# Patient Record
Sex: FEMALE | Race: WHITE | NOT HISPANIC OR LATINO
[De-identification: names, ages, dates, MRNs, and addresses within clinical notes are randomized per-mention and may not be internally consistent; named-entity substitution may affect disease eponyms.]

---

## 2017-05-31 PROBLEM — Z00.00 ENCOUNTER FOR PREVENTIVE HEALTH EXAMINATION: Status: ACTIVE | Noted: 2017-05-31

## 2017-07-25 PROBLEM — Z00.00 ENCOUNTER FOR PREVENTIVE HEALTH EXAMINATION: Noted: 2017-07-25

## 2017-08-11 ENCOUNTER — APPOINTMENT (OUTPATIENT)
Dept: HEART AND VASCULAR | Facility: CLINIC | Age: 72
End: 2017-08-11
Payer: MEDICARE

## 2017-08-11 VITALS
OXYGEN SATURATION: 98 % | WEIGHT: 122 LBS | SYSTOLIC BLOOD PRESSURE: 104 MMHG | HEIGHT: 63.5 IN | TEMPERATURE: 98 F | RESPIRATION RATE: 15 BRPM | BODY MASS INDEX: 21.35 KG/M2 | HEART RATE: 97 BPM | DIASTOLIC BLOOD PRESSURE: 70 MMHG

## 2017-08-11 DIAGNOSIS — R94.31 ABNORMAL ELECTROCARDIOGRAM [ECG] [EKG]: ICD-10-CM

## 2017-08-11 DIAGNOSIS — D05.12 INTRADUCTAL CARCINOMA IN SITU OF LEFT BREAST: ICD-10-CM

## 2017-08-11 DIAGNOSIS — B37.81 CANDIDAL ESOPHAGITIS: ICD-10-CM

## 2017-08-11 DIAGNOSIS — Z82.49 FAMILY HISTORY OF ISCHEMIC HEART DISEASE AND OTHER DISEASES OF THE CIRCULATORY SYSTEM: ICD-10-CM

## 2017-08-11 DIAGNOSIS — K90.0 CELIAC DISEASE: ICD-10-CM

## 2017-08-11 DIAGNOSIS — Z80.3 FAMILY HISTORY OF MALIGNANT NEOPLASM OF BREAST: ICD-10-CM

## 2017-08-11 DIAGNOSIS — R09.89 OTHER SPECIFIED SYMPTOMS AND SIGNS INVOLVING THE CIRCULATORY AND RESPIRATORY SYSTEMS: ICD-10-CM

## 2017-08-11 DIAGNOSIS — F41.8 OTHER SPECIFIED ANXIETY DISORDERS: ICD-10-CM

## 2017-08-11 DIAGNOSIS — Z82.3 FAMILY HISTORY OF STROKE: ICD-10-CM

## 2017-08-11 DIAGNOSIS — Z80.0 FAMILY HISTORY OF MALIGNANT NEOPLASM OF DIGESTIVE ORGANS: ICD-10-CM

## 2017-08-11 DIAGNOSIS — F17.210 NICOTINE DEPENDENCE, CIGARETTES, UNCOMPLICATED: ICD-10-CM

## 2017-08-11 PROCEDURE — 99215 OFFICE O/P EST HI 40 MIN: CPT | Mod: 25

## 2017-08-11 PROCEDURE — 93000 ELECTROCARDIOGRAM COMPLETE: CPT

## 2017-08-11 PROCEDURE — 93880 EXTRACRANIAL BILAT STUDY: CPT | Mod: XE

## 2017-08-11 PROCEDURE — 93306 TTE W/DOPPLER COMPLETE: CPT | Mod: XE

## 2017-08-11 RX ORDER — ALPRAZOLAM 0.5 MG/1
0.5 TABLET ORAL
Qty: 180 | Refills: 0 | Status: ACTIVE | COMMUNITY
Start: 2017-04-24

## 2017-08-14 ENCOUNTER — APPOINTMENT (OUTPATIENT)
Dept: HEART AND VASCULAR | Facility: CLINIC | Age: 72
End: 2017-08-14
Payer: MEDICARE

## 2017-08-14 PROBLEM — F41.8 ANXIETY AND DEPRESSION: Status: ACTIVE | Noted: 2017-08-14

## 2017-08-14 PROBLEM — K90.0 CELIAC DISEASE: Status: ACTIVE | Noted: 2017-08-14

## 2017-08-14 PROBLEM — B37.81 CANDIDA ESOPHAGITIS: Status: RESOLVED | Noted: 2017-08-14 | Resolved: 2017-08-14

## 2017-08-14 PROCEDURE — 78452 HT MUSCLE IMAGE SPECT MULT: CPT

## 2017-08-14 PROCEDURE — A9500: CPT

## 2017-08-14 PROCEDURE — 93015 CV STRESS TEST SUPVJ I&R: CPT

## 2017-08-14 PROCEDURE — A9505: CPT

## 2018-05-01 ENCOUNTER — APPOINTMENT (OUTPATIENT)
Dept: PULMONOLOGY | Facility: CLINIC | Age: 73
End: 2018-05-01
Payer: MEDICARE

## 2018-05-01 VITALS
DIASTOLIC BLOOD PRESSURE: 70 MMHG | SYSTOLIC BLOOD PRESSURE: 108 MMHG | HEIGHT: 65 IN | BODY MASS INDEX: 20.33 KG/M2 | WEIGHT: 122 LBS | HEART RATE: 84 BPM | OXYGEN SATURATION: 95 % | TEMPERATURE: 97.8 F

## 2018-05-01 PROCEDURE — 99204 OFFICE O/P NEW MOD 45 MIN: CPT | Mod: 25

## 2018-05-01 PROCEDURE — 94010 BREATHING CAPACITY TEST: CPT

## 2018-06-12 ENCOUNTER — OUTPATIENT (OUTPATIENT)
Dept: OUTPATIENT SERVICES | Facility: HOSPITAL | Age: 73
LOS: 1 days | End: 2018-06-12
Payer: MEDICARE

## 2018-06-12 PROCEDURE — 73630 X-RAY EXAM OF FOOT: CPT | Mod: 26,LT

## 2018-06-12 PROCEDURE — 73630 X-RAY EXAM OF FOOT: CPT

## 2018-06-12 PROCEDURE — 73600 X-RAY EXAM OF ANKLE: CPT

## 2018-06-12 PROCEDURE — 73600 X-RAY EXAM OF ANKLE: CPT | Mod: 26,LT

## 2018-07-25 PROBLEM — Z80.0 FAMILY HISTORY OF COLON CANCER: Status: ACTIVE | Noted: 2017-08-11

## 2018-07-25 PROBLEM — R94.31 ABNORMAL ELECTROCARDIOGRAM: Status: ACTIVE | Noted: 2017-08-14

## 2019-05-28 ENCOUNTER — APPOINTMENT (OUTPATIENT)
Dept: HEART AND VASCULAR | Facility: CLINIC | Age: 74
End: 2019-05-28
Payer: MEDICARE

## 2019-05-28 ENCOUNTER — LABORATORY RESULT (OUTPATIENT)
Age: 74
End: 2019-05-28

## 2019-05-28 ENCOUNTER — APPOINTMENT (OUTPATIENT)
Dept: HEART AND VASCULAR | Facility: CLINIC | Age: 74
End: 2019-05-28

## 2019-05-28 VITALS
TEMPERATURE: 97.8 F | OXYGEN SATURATION: 96 % | WEIGHT: 123 LBS | SYSTOLIC BLOOD PRESSURE: 118 MMHG | BODY MASS INDEX: 20.49 KG/M2 | HEIGHT: 65 IN | DIASTOLIC BLOOD PRESSURE: 76 MMHG | HEART RATE: 79 BPM | RESPIRATION RATE: 15 BRPM

## 2019-05-28 PROCEDURE — 36415 COLL VENOUS BLD VENIPUNCTURE: CPT

## 2019-05-28 PROCEDURE — 99214 OFFICE O/P EST MOD 30 MIN: CPT

## 2019-05-28 PROCEDURE — 93000 ELECTROCARDIOGRAM COMPLETE: CPT

## 2019-05-28 PROCEDURE — 93306 TTE W/DOPPLER COMPLETE: CPT

## 2019-05-28 RX ORDER — VENLAFAXINE HYDROCHLORIDE 150 MG/1
150 CAPSULE, EXTENDED RELEASE ORAL
Qty: 90 | Refills: 0 | Status: DISCONTINUED | COMMUNITY
Start: 2017-07-10 | End: 2019-05-28

## 2019-05-28 RX ORDER — ANASTROZOLE TABLETS 1 MG/1
1 TABLET ORAL
Qty: 90 | Refills: 0 | Status: DISCONTINUED | COMMUNITY
Start: 2017-07-28 | End: 2019-05-28

## 2019-05-29 LAB
ALBUMIN SERPL ELPH-MCNC: 4.5 G/DL
ALP BLD-CCNC: 64 U/L
ALT SERPL-CCNC: 18 U/L
ANION GAP SERPL CALC-SCNC: 14 MMOL/L
AST SERPL-CCNC: 36 U/L
BASOPHILS # BLD AUTO: 0.1 K/UL
BASOPHILS NFR BLD AUTO: 1 %
BILIRUB SERPL-MCNC: 0.5 MG/DL
BUN SERPL-MCNC: 13 MG/DL
CALCIUM SERPL-MCNC: 9.9 MG/DL
CHLORIDE SERPL-SCNC: 99 MMOL/L
CHOLEST SERPL-MCNC: 219 MG/DL
CHOLEST/HDLC SERPL: 2.8 RATIO
CO2 SERPL-SCNC: 25 MMOL/L
CREAT SERPL-MCNC: 0.95 MG/DL
CREAT SPEC-SCNC: 342 MG/DL
EOSINOPHIL # BLD AUTO: 0.15 K/UL
EOSINOPHIL NFR BLD AUTO: 1.5 %
ESTIMATED AVERAGE GLUCOSE: 91 MG/DL
FOLATE SERPL-MCNC: <2 NG/ML
GLUCOSE SERPL-MCNC: 69 MG/DL
HBA1C MFR BLD HPLC: 4.8 %
HCT VFR BLD CALC: 38.4 %
HDLC SERPL-MCNC: 78 MG/DL
HGB BLD-MCNC: 12.8 G/DL
IMM GRANULOCYTES NFR BLD AUTO: 0.3 %
LDLC SERPL CALC-MCNC: 121 MG/DL
LYMPHOCYTES # BLD AUTO: 2.91 K/UL
LYMPHOCYTES NFR BLD AUTO: 29.7 %
MAGNESIUM SERPL-MCNC: 1.6 MG/DL
MAN DIFF?: NORMAL
MCHC RBC-ENTMCNC: 33.3 GM/DL
MCHC RBC-ENTMCNC: 34.9 PG
MCV RBC AUTO: 104.6 FL
MICROALBUMIN 24H UR DL<=1MG/L-MCNC: 3.6 MG/DL
MICROALBUMIN/CREAT 24H UR-RTO: 11 MG/G
MONOCYTES # BLD AUTO: 1.14 K/UL
MONOCYTES NFR BLD AUTO: 11.6 %
NEUTROPHILS # BLD AUTO: 5.48 K/UL
NEUTROPHILS NFR BLD AUTO: 55.9 %
PLATELET # BLD AUTO: 364 K/UL
POTASSIUM SERPL-SCNC: 4.9 MMOL/L
PROT SERPL-MCNC: 7.2 G/DL
RBC # BLD: 3.67 M/UL
RBC # FLD: 17.4 %
SODIUM SERPL-SCNC: 138 MMOL/L
TRIGL SERPL-MCNC: 98 MG/DL
TSH SERPL-ACNC: 3.58 UIU/ML
VIT B12 SERPL-MCNC: 1014 PG/ML
WBC # FLD AUTO: 9.81 K/UL

## 2019-05-30 LAB
APPEARANCE: ABNORMAL
BILIRUBIN URINE: ABNORMAL
BLOOD URINE: NEGATIVE
COLOR: ABNORMAL
GLUCOSE QUALITATIVE U: NEGATIVE
KETONES URINE: ABNORMAL
LEUKOCYTE ESTERASE URINE: ABNORMAL
NITRITE URINE: NEGATIVE
PH URINE: 5
PROTEIN URINE: NORMAL
SPECIFIC GRAVITY URINE: 1.03
UROBILINOGEN URINE: NORMAL

## 2019-06-02 NOTE — HISTORY OF PRESENT ILLNESS
[FreeTextEntry1] : Long overdue for blood work \par \par No c/o  chest pains\par \par \par Still smoking  , her  also a smoker just had CABG  and was diagnosed with afib

## 2019-06-02 NOTE — DISCUSSION/SUMMARY
[Non-specific ECG Changes] : abnormal ECG [Echocardiogram] : an echocardiogram [Outpatient Evaluation] : the evaluation will be done as an outpatient [COPD] : chronic obstructive pulmonary disease [Smoking Cessation] : smoking cessation [___ Month(s)] : [unfilled] month(s) [With Me] : with me [FreeTextEntry1] : echocardiogram shows normal LVEF with normal   wall    motion , normal RVSP   and no pericardial effusion\par \par venipuncture performed with B12, folate, HgbA1c, lipids,  vitamin D, etc \par \par counseled regarding smoking cessation \par \par Set up    nuclear stress test in August

## 2019-06-02 NOTE — REVIEW OF SYSTEMS
[Leg Claudication] : intermittent leg claudication [Feeling Fatigued] : feeling fatigued [Dyspnea on exertion] : dyspnea during exertion [Anxiety] : anxiety [Negative] : Heme/Lymph

## 2019-06-02 NOTE — REASON FOR VISIT
[Follow-Up - Clinic] : a clinic follow-up of [Dyspnea] : dyspnea [Abnormal ECG] : an abnormal ECG [FreeTextEntry1] : 74  year old chronic heavy smoker  with hx of  DCIS of left breast  and HTN  presents for follow up. \par \par Resting EKG shows NSR   68 bpm with low voltage  but no ischemic changes  or ectopy. There is family hx of premature CAD.  Had negative nuclear  stress test  August 2017. \par \par She  also  c/o leg pains  and numbness of her hands and feet  but denies hx of thrombophilia \par \par Denies hemoptysis, fevers, syncope, orthopnea, PND, wheezing.  No hematuria, rectal bleeding, strokes or TIAs.

## 2019-06-02 NOTE — PHYSICAL EXAM
[General Appearance - Well Developed] : well developed [Well Groomed] : well groomed [General Appearance - Well Nourished] : well nourished [Normal Appearance] : normal appearance [No Deformities] : no deformities [General Appearance - In No Acute Distress] : no acute distress [Normal Conjunctiva] : the conjunctiva exhibited no abnormalities [Eyelids - No Xanthelasma] : the eyelids demonstrated no xanthelasmas [FreeTextEntry1] : no JVD [No Oral Cyanosis] : no oral cyanosis [Exaggerated Use Of Accessory Muscles For Inspiration] : no accessory muscle use [Heart Rate And Rhythm] : heart rate and rhythm were normal [Auscultation Breath Sounds / Voice Sounds] : lungs were clear to auscultation bilaterally [Respiration, Rhythm And Depth] : normal respiratory rhythm and effort [Heart Sounds] : normal S1 and S2 [Edema] : no peripheral edema present [Murmurs] : no murmurs present [Abdomen Soft] : soft [Bowel Sounds] : normal bowel sounds [Abdomen Tenderness] : non-tender [Abdomen Mass (___ Cm)] : no abdominal mass palpated [Gait - Sufficient For Exercise Testing] : the gait was sufficient for exercise testing [Abnormal Walk] : normal gait [Nail Splinter Hemorrhages] : no splinter hemorrhages of the nails [Petechial Hemorrhages (___cm)] : no petechial hemorrhages [Nail Clubbing] : no clubbing of the fingernails [Cyanosis, Localized] : no localized cyanosis [Fingers Osler's Nodes] : Osler's nodes were not seenon the fingers [Skin Color & Pigmentation] : normal skin color and pigmentation [] : no rash [Skin Turgor] : normal skin turgor [No Venous Stasis] : no venous stasis [No Skin Ulcers] : no skin ulcer [Skin Lesions] : no skin lesions [Oriented To Time, Place, And Person] : oriented to person, place, and time [No Xanthoma] : no  xanthoma was observed [Impaired Insight] : insight and judgment were intact [Mood] : the mood was normal [Affect] : the affect was normal [No Anxiety] : not feeling anxious [Memory Recent] : recent memory was not impaired [Memory Remote] : remote memory was not impaired

## 2019-06-18 ENCOUNTER — CLINICAL ADVICE (OUTPATIENT)
Age: 74
End: 2019-06-18

## 2019-06-30 ENCOUNTER — FORM ENCOUNTER (OUTPATIENT)
Age: 74
End: 2019-06-30

## 2019-07-01 ENCOUNTER — FORM ENCOUNTER (OUTPATIENT)
Age: 74
End: 2019-07-01

## 2019-07-01 ENCOUNTER — OUTPATIENT (OUTPATIENT)
Dept: OUTPATIENT SERVICES | Facility: HOSPITAL | Age: 74
LOS: 1 days | End: 2019-07-01
Payer: MEDICARE

## 2019-07-01 ENCOUNTER — APPOINTMENT (OUTPATIENT)
Dept: CT IMAGING | Facility: HOSPITAL | Age: 74
End: 2019-07-01
Payer: MEDICARE

## 2019-07-01 PROCEDURE — 75574 CT ANGIO HRT W/3D IMAGE: CPT

## 2019-07-01 PROCEDURE — 75574 CT ANGIO HRT W/3D IMAGE: CPT | Mod: 26

## 2019-07-02 ENCOUNTER — OUTPATIENT (OUTPATIENT)
Dept: OUTPATIENT SERVICES | Facility: HOSPITAL | Age: 74
LOS: 1 days | End: 2019-07-02
Payer: MEDICARE

## 2019-07-02 PROCEDURE — 0503T: CPT

## 2019-07-02 PROCEDURE — 0504T: CPT

## 2019-07-02 PROCEDURE — 0502T: CPT

## 2019-07-06 DIAGNOSIS — K76.9 LIVER DISEASE, UNSPECIFIED: ICD-10-CM

## 2019-07-08 PROBLEM — K76.9 LIVER LESION: Status: ACTIVE | Noted: 2019-07-08

## 2019-07-11 VITALS
DIASTOLIC BLOOD PRESSURE: 70 MMHG | OXYGEN SATURATION: 100 % | TEMPERATURE: 98 F | SYSTOLIC BLOOD PRESSURE: 118 MMHG | HEART RATE: 75 BPM

## 2019-07-11 NOTE — H&P ADULT - NSHPSOCIALHISTORY_GEN_ALL_CORE
tobacco  etoh  elicit drug use current smoker, 1ppd x 55 years  4-5 scotches daily   cocaine use in 80's

## 2019-07-11 NOTE — H&P ADULT - NSHPLABSRESULTS_GEN_ALL_CORE
13.1   15.16 )-----------( 362      ( 15 Jul 2019 15:15 )             39.7       07-15    135  |  94<L>  |  18  ----------------------------<  88  5.0   |  18<L>  |  0.83    Ca    10.0      15 Jul 2019 15:15    TPro  7.8  /  Alb  4.6  /  TBili  1.2  /  DBili  x   /  AST  143<H>  /  ALT  88<H>  /  AlkPhos  190<H>  07-15      PT/INR - ( 15 Jul 2019 15:15 )   PT: 11.2 sec;   INR: 0.99          PTT - ( 15 Jul 2019 15:15 )  PTT:27.6 sec    EKG: NSR at 75 bpm. No evidence of acute ischemia.

## 2019-07-11 NOTE — H&P ADULT - HISTORY OF PRESENT ILLNESS
SKELETON   74 Y F with pmh HTN who presented to her cardiologist c/o PEREZ with ambulating______ occurring for ______.  Denies  ECHO 5/28/19: normal LV and RV function, abnormal LV diastolic function, EF 60%, trace MR, TR, and OH. No pericardial effusion.  CTA coronaries 6/14/19: Ca score 1619 Agatston units (92nd percentile), moderate stenosis in mLAD, circumferential calcific plaque in D2 precludes evaluation of lumen, non obstructive disease in remaining segments. LCx FFR 0.92 (negative), LAD 0.78 (positive), RCA 0.83 (negative)  In light of risk factors, CCS angina class ___ symptoms, and abnormal CTA coronaries pt is recommended for cardiac catheterization with possible intervention. confirm medications   74 Y F with pmh current smoker daily ETOH use, Celiac's disease, breast cancer (DCIS, L sided lumpectomy 3 yrs ago and on exemestane), anxiety, WAQAR (CPAP with intermittent compliance), HTN, who presented to her cardiologist for routine follow up of HTN.  Pt does endorse intermittent diaphoresis with palpitations occurring x few months. Denies CP, PEREZ, orthopnea, PND, fever, chills, LE edema, melena, or hematuria.   ECHO 5/28/19: normal LV and RV function, abnormal LV diastolic function, EF 60%, trace MR, TR, and OR. No pericardial effusion.  CTA coronaries 6/14/19: Ca score 1619 Agatston units (92nd percentile), moderate stenosis in mLAD, circumferential calcific plaque in D2 precludes evaluation of lumen, non obstructive disease in remaining segments. LCx FFR 0.92 (negative), LAD 0.78 (positive), RCA 0.83 (negative)  In light of risk factors, CCS angina class II symptoms, and abnormal CTA coronaries pt is recommended for cardiac catheterization with possible intervention. 74 Y F with pmh current smoker daily ETOH use, Celiac's disease, breast cancer (DCIS, L sided lumpectomy 3 yrs ago and on exemestane), anxiety, WAQAR (CPAP with intermittent compliance), HTN, who presented to her cardiologist for routine follow up of HTN.  Pt does endorse intermittent diaphoresis with palpitations occurring x few months. Denies CP, PEREZ, orthopnea, PND, fever, chills, LE edema, melena, or hematuria.  ECHO 5/28/19: normal LV and RV function, abnormal LV diastolic function, EF 60%, trace MR, TR, and [place]. No pericardial effusion. CTA coronaries 6/14/19: Ca score 1619 Agatston units (92nd percentile), moderate stenosis in mLAD, circumferential calcific plaque in D2 precludes evaluation of lumen, non obstructive disease in remaining segments. LCx FFR 0.92 (negative), LAD 0.78 (positive), RCA 0.83 (negative).  In light of risk factors, CCS angina class II symptoms, and abnormal CTA coronaries pt is recommended for cardiac catheterization with possible intervention. 74 Y F with pmh current smoker daily ETOH and prescription xanax use, Celiac's disease, breast cancer (DCIS, L sided lumpectomy 3 yrs ago and on exemestane), anxiety, WAQAR (CPAP with intermittent compliance), HTN, who presented to her cardiologist for routine follow up of HTN.  Pt does endorse intermittent diaphoresis with palpitations occurring x few months. Denies CP, PEREZ, orthopnea, PND, fever, chills, LE edema, melena, or hematuria.  ECHO 5/28/19: normal LV and RV function, abnormal LV diastolic function, EF 60%, trace MR, TR, and SC. No pericardial effusion. CTA coronaries 6/14/19: Ca score 1619 Agatston units (92nd percentile), moderate stenosis in mLAD, circumferential calcific plaque in D2 precludes evaluation of lumen, non obstructive disease in remaining segments. LCx FFR 0.92 (negative), LAD 0.78 (positive), RCA 0.83 (negative).  In light of risk factors, CCS angina class II symptoms, and abnormal CTA coronaries pt is recommended for cardiac catheterization with possible intervention.

## 2019-07-11 NOTE — H&P ADULT - RS GEN PE MLT RESP DETAILS PC
no rales/good air movement/respirations non-labored/normal/breath sounds equal/no rhonchi/clear to auscultation bilaterally

## 2019-07-11 NOTE — H&P ADULT - ASSESSMENT
74 Y F with pmh current smoker daily ETOH and prescription xanax use, Celiac's disease, breast cancer (DCIS, L sided lumpectomy 3 yrs ago and on exemestane), anxiety, WAQAR (CPAP with intermittent compliance), HTN, who presents to [facility] for cardiac catheterization after abnormal CTA coronary.     IV NS at 75 cc/hr.   Loaded with Plavix 600 mg PO x 1 and Aspirin 325 mg PO x 1.   WBC is elevated at 15.16 with left shift. Patient has had a cough which produced clear sputum for the last 2 weeks with associated nasal congestion in am and watery eyes. No sore throat, h/a, fevers, or chills. No UTI sx. Likely allergic in nature. Dr. Jimenez aware.   Patient with possible shellfish allergy. Did well with IV contrast die from CTA coronary. Treatment with steroids not indicated. Dr. Jimenez aware.     ASA Class III    Mallampati Class III    Risks & benefits of procedure and alternative therapy have been explained to the patient including but not limited to: allergic reaction, bleeding with possible need for blood transfusion, infection, renal and vascular compromise, limb damage, arrhythmia, stroke, vessel dissection/perforation, Myocardial infarction, emergent CABG. Informed consent obtained and in chart.

## 2019-07-11 NOTE — H&P ADULT - NSICDXPASTMEDICALHX_GEN_ALL_CORE_FT
PAST MEDICAL HISTORY:  Hypertension PAST MEDICAL HISTORY:  Breast cancer L sided DCIS    Hypertension PAST MEDICAL HISTORY:  Anxiety     Breast cancer L sided DCIS    Celiac disease     Hypertension     WAQAR (obstructive sleep apnea)

## 2019-07-11 NOTE — H&P ADULT - NSICDXFAMILYHX_GEN_ALL_CORE_FT
FAMILY HISTORY:  Family history of acute myocardial infarction, father  FH: diabetes mellitus, mother  FH: stroke, mother

## 2019-07-11 NOTE — H&P ADULT - NSICDXPASTSURGICALHX_GEN_ALL_CORE_FT
PAST SURGICAL HISTORY:  History of partial hysterectomy with oopharectomy and fibroidectomy    S/P lumpectomy, left breast

## 2019-07-15 ENCOUNTER — OUTPATIENT (OUTPATIENT)
Dept: OUTPATIENT SERVICES | Facility: HOSPITAL | Age: 74
LOS: 1 days | Discharge: NURSING, REHAB OR LONG-TERM CARE | End: 2019-07-15
Payer: MEDICARE

## 2019-07-15 DIAGNOSIS — Z90.711 ACQUIRED ABSENCE OF UTERUS WITH REMAINING CERVICAL STUMP: Chronic | ICD-10-CM

## 2019-07-15 DIAGNOSIS — Z98.890 OTHER SPECIFIED POSTPROCEDURAL STATES: Chronic | ICD-10-CM

## 2019-07-15 LAB
ALBUMIN SERPL ELPH-MCNC: 4.6 G/DL — SIGNIFICANT CHANGE UP (ref 3.3–5)
ALP SERPL-CCNC: 190 U/L — HIGH (ref 40–120)
ALT FLD-CCNC: 88 U/L — HIGH (ref 10–45)
ANION GAP SERPL CALC-SCNC: 23 MMOL/L — HIGH (ref 5–17)
APTT BLD: 27.6 SEC — SIGNIFICANT CHANGE UP (ref 27.5–36.3)
AST SERPL-CCNC: 143 U/L — HIGH (ref 10–40)
BASOPHILS # BLD AUTO: 0.08 K/UL — SIGNIFICANT CHANGE UP (ref 0–0.2)
BASOPHILS NFR BLD AUTO: 0.5 % — SIGNIFICANT CHANGE UP (ref 0–2)
BILIRUB SERPL-MCNC: 1.2 MG/DL — SIGNIFICANT CHANGE UP (ref 0.2–1.2)
BUN SERPL-MCNC: 18 MG/DL — SIGNIFICANT CHANGE UP (ref 7–23)
CALCIUM SERPL-MCNC: 10 MG/DL — SIGNIFICANT CHANGE UP (ref 8.4–10.5)
CHLORIDE SERPL-SCNC: 94 MMOL/L — LOW (ref 96–108)
CHOLEST SERPL-MCNC: 166 MG/DL — SIGNIFICANT CHANGE UP (ref 10–199)
CK MB CFR SERPL CALC: 5 NG/ML — SIGNIFICANT CHANGE UP (ref 0–6.7)
CK SERPL-CCNC: 199 U/L — HIGH (ref 25–170)
CO2 SERPL-SCNC: 18 MMOL/L — LOW (ref 22–31)
CREAT SERPL-MCNC: 0.83 MG/DL — SIGNIFICANT CHANGE UP (ref 0.5–1.3)
EOSINOPHIL # BLD AUTO: 0.05 K/UL — SIGNIFICANT CHANGE UP (ref 0–0.5)
EOSINOPHIL NFR BLD AUTO: 0.3 % — SIGNIFICANT CHANGE UP (ref 0–6)
GLUCOSE SERPL-MCNC: 88 MG/DL — SIGNIFICANT CHANGE UP (ref 70–99)
HBA1C BLD-MCNC: 4.3 % — SIGNIFICANT CHANGE UP (ref 4–5.6)
HCT VFR BLD CALC: 39.7 % — SIGNIFICANT CHANGE UP (ref 34.5–45)
HDLC SERPL-MCNC: 46 MG/DL — LOW
HGB BLD-MCNC: 13.1 G/DL — SIGNIFICANT CHANGE UP (ref 11.5–15.5)
IMM GRANULOCYTES NFR BLD AUTO: 0.4 % — SIGNIFICANT CHANGE UP (ref 0–1.5)
INR BLD: 0.99 — SIGNIFICANT CHANGE UP (ref 0.88–1.16)
LIPID PNL WITH DIRECT LDL SERPL: 93 MG/DL — SIGNIFICANT CHANGE UP
LYMPHOCYTES # BLD AUTO: 1.87 K/UL — SIGNIFICANT CHANGE UP (ref 1–3.3)
LYMPHOCYTES # BLD AUTO: 12.3 % — LOW (ref 13–44)
MCHC RBC-ENTMCNC: 33 GM/DL — SIGNIFICANT CHANGE UP (ref 32–36)
MCHC RBC-ENTMCNC: 34.3 PG — HIGH (ref 27–34)
MCV RBC AUTO: 103.9 FL — HIGH (ref 80–100)
MONOCYTES # BLD AUTO: 1.43 K/UL — HIGH (ref 0–0.9)
MONOCYTES NFR BLD AUTO: 9.4 % — SIGNIFICANT CHANGE UP (ref 2–14)
NEUTROPHILS # BLD AUTO: 11.67 K/UL — HIGH (ref 1.8–7.4)
NEUTROPHILS NFR BLD AUTO: 77.1 % — HIGH (ref 43–77)
NRBC # BLD: 0 /100 WBCS — SIGNIFICANT CHANGE UP (ref 0–0)
PLATELET # BLD AUTO: 362 K/UL — SIGNIFICANT CHANGE UP (ref 150–400)
POTASSIUM SERPL-MCNC: 5 MMOL/L — SIGNIFICANT CHANGE UP (ref 3.5–5.3)
POTASSIUM SERPL-SCNC: 5 MMOL/L — SIGNIFICANT CHANGE UP (ref 3.5–5.3)
PROT SERPL-MCNC: 7.8 G/DL — SIGNIFICANT CHANGE UP (ref 6–8.3)
PROTHROM AB SERPL-ACNC: 11.2 SEC — SIGNIFICANT CHANGE UP (ref 10–12.9)
RBC # BLD: 3.82 M/UL — SIGNIFICANT CHANGE UP (ref 3.8–5.2)
RBC # FLD: 15.2 % — HIGH (ref 10.3–14.5)
SODIUM SERPL-SCNC: 135 MMOL/L — SIGNIFICANT CHANGE UP (ref 135–145)
TOTAL CHOLESTEROL/HDL RATIO MEASUREMENT: 3.6 RATIO — SIGNIFICANT CHANGE UP (ref 3.3–7.1)
TRIGL SERPL-MCNC: 137 MG/DL — SIGNIFICANT CHANGE UP (ref 10–149)
WBC # BLD: 15.16 K/UL — HIGH (ref 3.8–10.5)
WBC # FLD AUTO: 15.16 K/UL — HIGH (ref 3.8–10.5)

## 2019-07-15 PROCEDURE — 85610 PROTHROMBIN TIME: CPT

## 2019-07-15 PROCEDURE — 93458 L HRT ARTERY/VENTRICLE ANGIO: CPT

## 2019-07-15 PROCEDURE — 93458 L HRT ARTERY/VENTRICLE ANGIO: CPT | Mod: 26

## 2019-07-15 PROCEDURE — 85025 COMPLETE CBC W/AUTO DIFF WBC: CPT

## 2019-07-15 PROCEDURE — 82553 CREATINE MB FRACTION: CPT

## 2019-07-15 PROCEDURE — 80053 COMPREHEN METABOLIC PANEL: CPT

## 2019-07-15 PROCEDURE — 85730 THROMBOPLASTIN TIME PARTIAL: CPT

## 2019-07-15 PROCEDURE — 83036 HEMOGLOBIN GLYCOSYLATED A1C: CPT

## 2019-07-15 PROCEDURE — C1887: CPT

## 2019-07-15 PROCEDURE — C1769: CPT

## 2019-07-15 PROCEDURE — 93571 IV DOP VEL&/PRESS C FLO 1ST: CPT | Mod: 26,LC

## 2019-07-15 PROCEDURE — 80061 LIPID PANEL: CPT

## 2019-07-15 PROCEDURE — 93571 IV DOP VEL&/PRESS C FLO 1ST: CPT | Mod: LC

## 2019-07-15 PROCEDURE — 82550 ASSAY OF CK (CPK): CPT

## 2019-07-15 PROCEDURE — C1894: CPT

## 2019-07-15 RX ORDER — ATORVASTATIN CALCIUM 80 MG/1
1 TABLET, FILM COATED ORAL
Qty: 0 | Refills: 0 | DISCHARGE

## 2019-07-15 RX ORDER — FLUOXETINE HCL 10 MG
1 CAPSULE ORAL
Qty: 0 | Refills: 0 | DISCHARGE

## 2019-07-15 RX ORDER — ASPIRIN/CALCIUM CARB/MAGNESIUM 324 MG
325 TABLET ORAL ONCE
Refills: 0 | Status: COMPLETED | OUTPATIENT
Start: 2019-07-15 | End: 2019-07-15

## 2019-07-15 RX ORDER — CHLORHEXIDINE GLUCONATE 213 G/1000ML
1 SOLUTION TOPICAL ONCE
Refills: 0 | Status: DISCONTINUED | OUTPATIENT
Start: 2019-07-15 | End: 2019-07-15

## 2019-07-15 RX ORDER — NICOTINE POLACRILEX 2 MG
0 GUM BUCCAL
Qty: 0 | Refills: 0 | DISCHARGE

## 2019-07-15 RX ORDER — FOLIC ACID 0.8 MG
1 TABLET ORAL
Qty: 0 | Refills: 0 | DISCHARGE

## 2019-07-15 RX ORDER — EXEMESTANE 25 MG/1
1 TABLET, SUGAR COATED ORAL
Qty: 0 | Refills: 0 | DISCHARGE

## 2019-07-15 RX ORDER — SODIUM CHLORIDE 9 MG/ML
500 INJECTION INTRAMUSCULAR; INTRAVENOUS; SUBCUTANEOUS
Refills: 0 | Status: DISCONTINUED | OUTPATIENT
Start: 2019-07-15 | End: 2019-07-15

## 2019-07-15 RX ORDER — ALPRAZOLAM 0.25 MG
1 TABLET ORAL
Qty: 0 | Refills: 0 | DISCHARGE

## 2019-07-15 RX ORDER — CLOPIDOGREL BISULFATE 75 MG/1
600 TABLET, FILM COATED ORAL ONCE
Refills: 0 | Status: COMPLETED | OUTPATIENT
Start: 2019-07-15 | End: 2019-07-15

## 2019-07-15 RX ADMIN — CLOPIDOGREL BISULFATE 600 MILLIGRAM(S): 75 TABLET, FILM COATED ORAL at 16:21

## 2019-07-15 RX ADMIN — SODIUM CHLORIDE 75 MILLILITER(S): 9 INJECTION INTRAMUSCULAR; INTRAVENOUS; SUBCUTANEOUS at 16:17

## 2019-07-15 RX ADMIN — Medication 325 MILLIGRAM(S): at 16:21

## 2019-07-15 NOTE — PROGRESS NOTE ADULT - SUBJECTIVE AND OBJECTIVE BOX
Interventional Cardiology PA SDA Discharge Note    Patient without complaints. Ambulated and voided without difficulties    Afebrile, VSS    Ext:    				Right  Radial : no   hematoma,  no   bleeding, dressing; C/D/I      Pulses:    intact RAD to baseline     A/P:        74 Y F with pmh current smoker daily ETOH and prescription xanax use, Celiac's disease, breast cancer (DCIS, L sided lumpectomy 3 yrs ago and on exemestane), anxiety, WAQAR (CPAP with intermittent compliance), HTN, who presents to [facility] for cardiac catheterization after abnormal CTA coronary. Pt now s/p diagnostic cardiac cath 7/15/19. Right radial access          1.	Stable for discharge as per attending Dr. Jimenez after bed rest, pt voids, groin/wrist stable and 30 minutes of ambulation.  2.	Follow-up with PMD/Cardiologist Dr. Aburto in 1-2 weeks  3.	Discharged forms signed and copies in chart Interventional Cardiology PA SDA Discharge Note    Patient without complaints.     Afebrile, VSS    Ext:    				Right  Radial : no   hematoma,  no   bleeding, dressing; C/D/I      Pulses:    intact RAD to baseline     A/P:        74 Y F with pmh current smoker daily ETOH and prescription xanax use, Celiac's disease, breast cancer (DCIS, L sided lumpectomy 3 yrs ago and on exemestane), anxiety, WAQAR (CPAP with intermittent compliance), HTN, who presents to [facility] for cardiac catheterization after abnormal CTA coronary. Pt now s/p diagnostic cardiac cath 7/15/19. Right radial access. Pt noted to have transaminitis on pre-cath labs. Pt instructed to hold atorvastatin and follow up with Dr. Aburto with repeat liver enzymes. Elevated liver enzymes could also be 2/2 daily ETOH use and Celiac disease.           1.	Stable for discharge as per attending Dr. Jimenez after bed rest, pt voids, groin/wrist stable and 30 minutes of ambulation.  2.	Follow-up with PMD/Cardiologist Dr. Aburto in 1-2 weeks  3.	Discharged forms signed and copies in chart Interventional Cardiology PA SDA Discharge Note    Patient without complaints.     Afebrile, VSS    Ext:    				Right  Radial : no   hematoma,  no   bleeding, dressing; C/D/I      Pulses:    intact RAD to baseline     A/P:        74 Y F with pmh current smoker daily ETOH and prescription xanax use, Celiac's disease, breast cancer (DCIS, L sided lumpectomy 3 yrs ago and on exemestane), anxiety, WAQAR (CPAP with intermittent compliance), HTN, who presents to [facility] for cardiac catheterization after abnormal CTA coronary. Pt now s/p diagnostic cardiac cath 7/15/19. Right radial access. Pt noted to have transaminitis on pre-cath labs. Pt instructed to d/c atorvastatin and follow up with Dr. Aburto with repeat liver enzymes. Elevated liver enzymes could also be 2/2 daily ETOH use and Celiac disease.           1.	Stable for discharge as per attending Dr. Jimenez after bed rest, pt voids, wrist stable and 30 minutes of ambulation.  2.	Follow-up with PMD/Cardiologist Dr. Aburto in 1-2 weeks  3.	Discharged forms signed and copies in chart

## 2019-08-20 PROBLEM — G47.33 OBSTRUCTIVE SLEEP APNEA (ADULT) (PEDIATRIC): Chronic | Status: ACTIVE | Noted: 2019-07-15

## 2019-08-20 PROBLEM — K90.0 CELIAC DISEASE: Chronic | Status: ACTIVE | Noted: 2019-07-15

## 2019-08-20 PROBLEM — I10 ESSENTIAL (PRIMARY) HYPERTENSION: Chronic | Status: ACTIVE | Noted: 2019-07-11

## 2019-08-20 PROBLEM — C50.919 MALIGNANT NEOPLASM OF UNSPECIFIED SITE OF UNSPECIFIED FEMALE BREAST: Chronic | Status: ACTIVE | Noted: 2019-07-11

## 2019-08-26 ENCOUNTER — APPOINTMENT (OUTPATIENT)
Dept: HEART AND VASCULAR | Facility: CLINIC | Age: 74
End: 2019-08-26
Payer: MEDICARE

## 2019-08-26 VITALS
HEIGHT: 65 IN | SYSTOLIC BLOOD PRESSURE: 140 MMHG | HEART RATE: 76 BPM | BODY MASS INDEX: 20.49 KG/M2 | WEIGHT: 123 LBS | TEMPERATURE: 97.7 F | DIASTOLIC BLOOD PRESSURE: 80 MMHG | OXYGEN SATURATION: 96 % | RESPIRATION RATE: 14 BRPM

## 2019-08-26 DIAGNOSIS — I10 ESSENTIAL (PRIMARY) HYPERTENSION: ICD-10-CM

## 2019-08-26 DIAGNOSIS — I25.10 ATHEROSCLEROTIC HEART DISEASE OF NATIVE CORONARY ARTERY W/OUT ANGINA PECTORIS: ICD-10-CM

## 2019-08-26 DIAGNOSIS — R29.6 REPEATED FALLS: ICD-10-CM

## 2019-08-26 PROCEDURE — 99214 OFFICE O/P EST MOD 30 MIN: CPT

## 2019-08-26 PROCEDURE — 36415 COLL VENOUS BLD VENIPUNCTURE: CPT

## 2019-08-26 NOTE — REVIEW OF SYSTEMS
[Feeling Fatigued] : feeling fatigued [Dyspnea on exertion] : dyspnea during exertion [Leg Claudication] : intermittent leg claudication [Anxiety] : anxiety [Negative] : Heme/Lymph

## 2019-08-26 NOTE — DISCUSSION/SUMMARY
[Coronary Artery Disease] : coronary artery disease [Hyperlipidemia] : hyperlipidemia [Lipids Test Panel] : a fasting lipid profile [Known CAD] : known coronary artery disease [Hypertension] : hypertension [<70] : goal is <70 [>50] : goal is >50 [At Goal] : The HDL is at goal [Essential Hypertension] : essential hypertension [Stable] : stable [Responding to Treatment] : responding to treatment [None] : none [___ Month(s)] : [unfilled] month(s) [With Me] : with me [FreeTextEntry1] : venipuncture performed to assess lipids, LFTs\par \par will refer VNS for falls prevention /balance gait  exercises \par \par determined that her Omron BP cuff is not accurate (too high readings recorded) advised to call  [phone] number listed on the device to have it recalibrated

## 2019-08-26 NOTE — ASSESSMENT
[FreeTextEntry1] : stable hemodynamics\par \par anxiety\par \par smoker/excessive alcohol intake \par \par hyperlipidemia  with CAD (nonobstructive by cath)

## 2019-08-26 NOTE — PHYSICAL EXAM
[General Appearance - Well Developed] : well developed [Normal Appearance] : normal appearance [Well Groomed] : well groomed [General Appearance - Well Nourished] : well nourished [No Deformities] : no deformities [General Appearance - In No Acute Distress] : no acute distress [Normal Conjunctiva] : the conjunctiva exhibited no abnormalities [Eyelids - No Xanthelasma] : the eyelids demonstrated no xanthelasmas [No Oral Cyanosis] : no oral cyanosis [FreeTextEntry1] : no JVD [Respiration, Rhythm And Depth] : normal respiratory rhythm and effort [Exaggerated Use Of Accessory Muscles For Inspiration] : no accessory muscle use [Auscultation Breath Sounds / Voice Sounds] : lungs were clear to auscultation bilaterally [Heart Rate And Rhythm] : heart rate and rhythm were normal [Heart Sounds] : normal S1 and S2 [Murmurs] : no murmurs present [Edema] : no peripheral edema present [Bowel Sounds] : normal bowel sounds [Abdomen Soft] : soft [Abdomen Tenderness] : non-tender [Abdomen Mass (___ Cm)] : no abdominal mass palpated [Abnormal Walk] : normal gait [Gait - Sufficient For Exercise Testing] : the gait was sufficient for exercise testing [Nail Clubbing] : no clubbing of the fingernails [Cyanosis, Localized] : no localized cyanosis [Petechial Hemorrhages (___cm)] : no petechial hemorrhages [Nail Splinter Hemorrhages] : no splinter hemorrhages of the nails [Fingers Osler's Nodes] : Osler's nodes were not seenon the fingers [Skin Color & Pigmentation] : normal skin color and pigmentation [Skin Turgor] : normal skin turgor [] : no rash [No Venous Stasis] : no venous stasis [Skin Lesions] : no skin lesions [No Skin Ulcers] : no skin ulcer [No Xanthoma] : no  xanthoma was observed [Oriented To Time, Place, And Person] : oriented to person, place, and time [Impaired Insight] : insight and judgment were intact [Affect] : the affect was normal [Mood] : the mood was normal [Memory Recent] : recent memory was not impaired [Memory Remote] : remote memory was not impaired [No Anxiety] : not feeling anxious

## 2019-08-26 NOTE — HISTORY OF PRESENT ILLNESS
[FreeTextEntry1] : Presents for BP assessment and evaluation of her home BP monitor for accuracy\par \par Needs  blood work to assess lipid status and LFTs\par \par Had breast follow up with her oncologist- all is stable , no evidence of disease at this time \par \par s/p recent fall secondary to leg weakness from lumbar radiculopathy (but she also smokes and drinks alcohol)  denies head trauma but sustained a bruise of her left elbow . "she was a dead weight" and her  had a hard time picking her up. Denies LOC.

## 2019-08-26 NOTE — REASON FOR VISIT
[Follow-Up - Clinic] : a clinic follow-up of [Coronary Artery Disease] : coronary artery disease [Fatigue] : feeling tired (fatigue) [Hyperlipidemia] : hyperlipidemia [Hypertension] : hypertension [FreeTextEntry1] : 74  year old chronic heavy smoker  with hx of  DCIS of left breast  and HTN  presents for follow up. \par \par Resting EKG shows NSR   68 bpm with low voltage  but no ischemic changes  or ectopy. There is family hx of premature CAD.  Had negative nuclear  stress test  August 2017. \par \par She  also  c/o leg pains  and numbness of her hands and feet  but denies hx of thrombophilia \par \par Denies hemoptysis, fevers, syncope, orthopnea, PND, wheezing.  No hematuria, rectal bleeding, strokes or TIAs.  [Spouse] : spouse

## 2019-08-27 LAB
ALBUMIN SERPL ELPH-MCNC: 4.7 G/DL
ALP BLD-CCNC: 128 U/L
ALT SERPL-CCNC: 53 U/L
ANION GAP SERPL CALC-SCNC: 17 MMOL/L
AST SERPL-CCNC: 132 U/L
BILIRUB SERPL-MCNC: 0.6 MG/DL
BUN SERPL-MCNC: 12 MG/DL
CALCIUM SERPL-MCNC: 10 MG/DL
CHLORIDE SERPL-SCNC: 99 MMOL/L
CHOLEST SERPL-MCNC: 163 MG/DL
CHOLEST/HDLC SERPL: 2.3 RATIO
CO2 SERPL-SCNC: 22 MMOL/L
CREAT SERPL-MCNC: 0.73 MG/DL
HDLC SERPL-MCNC: 72 MG/DL
LDLC SERPL CALC-MCNC: 75 MG/DL
POTASSIUM SERPL-SCNC: 4.8 MMOL/L
PROT SERPL-MCNC: 7.3 G/DL
SODIUM SERPL-SCNC: 138 MMOL/L
TRIGL SERPL-MCNC: 78 MG/DL

## 2019-12-27 ENCOUNTER — APPOINTMENT (OUTPATIENT)
Dept: HEART AND VASCULAR | Facility: CLINIC | Age: 74
End: 2019-12-27

## 2020-02-27 ENCOUNTER — LABORATORY RESULT (OUTPATIENT)
Age: 75
End: 2020-02-27

## 2020-02-27 ENCOUNTER — APPOINTMENT (OUTPATIENT)
Dept: HEART AND VASCULAR | Facility: CLINIC | Age: 75
End: 2020-02-27
Payer: MEDICARE

## 2020-02-27 VITALS
RESPIRATION RATE: 14 BRPM | HEART RATE: 92 BPM | TEMPERATURE: 96.6 F | SYSTOLIC BLOOD PRESSURE: 110 MMHG | OXYGEN SATURATION: 96 % | DIASTOLIC BLOOD PRESSURE: 80 MMHG

## 2020-02-27 PROCEDURE — 99214 OFFICE O/P EST MOD 30 MIN: CPT

## 2020-02-27 PROCEDURE — 93000 ELECTROCARDIOGRAM COMPLETE: CPT

## 2020-02-27 PROCEDURE — 36415 COLL VENOUS BLD VENIPUNCTURE: CPT

## 2020-02-27 RX ORDER — ATORVASTATIN CALCIUM 40 MG/1
40 TABLET, FILM COATED ORAL DAILY
Qty: 1 | Refills: 1 | Status: DISCONTINUED | COMMUNITY
Start: 2019-07-06 | End: 2020-02-27

## 2020-02-27 RX ORDER — EXEMESTANE 25 MG/1
25 TABLET, FILM COATED ORAL
Qty: 30 | Refills: 0 | Status: DISCONTINUED | COMMUNITY
Start: 2019-04-27 | End: 2020-02-27

## 2020-02-28 LAB
25(OH)D3 SERPL-MCNC: 17 NG/ML
ALBUMIN SERPL ELPH-MCNC: 4.4 G/DL
ALP BLD-CCNC: 89 U/L
ALT SERPL-CCNC: 15 U/L
ANION GAP SERPL CALC-SCNC: 22 MMOL/L
APPEARANCE: ABNORMAL
AST SERPL-CCNC: 28 U/L
BASOPHILS # BLD AUTO: 0.11 K/UL
BASOPHILS NFR BLD AUTO: 1 %
BILIRUB SERPL-MCNC: 0.4 MG/DL
BILIRUBIN URINE: NEGATIVE
BLOOD URINE: NEGATIVE
BUN SERPL-MCNC: 15 MG/DL
CALCIUM SERPL-MCNC: 10.2 MG/DL
CHLORIDE SERPL-SCNC: 103 MMOL/L
CHOLEST SERPL-MCNC: 246 MG/DL
CHOLEST/HDLC SERPL: 3.4 RATIO
CO2 SERPL-SCNC: 19 MMOL/L
COLOR: YELLOW
CREAT SERPL-MCNC: 0.7 MG/DL
CREAT SPEC-SCNC: 266 MG/DL
EOSINOPHIL # BLD AUTO: 0.15 K/UL
EOSINOPHIL NFR BLD AUTO: 1.4 %
ESTIMATED AVERAGE GLUCOSE: 91 MG/DL
FOLATE SERPL-MCNC: <2 NG/ML
GLUCOSE QUALITATIVE U: NEGATIVE
GLUCOSE SERPL-MCNC: 84 MG/DL
HBA1C MFR BLD HPLC: 4.8 %
HCT VFR BLD CALC: 42.1 %
HDLC SERPL-MCNC: 72 MG/DL
HGB BLD-MCNC: 13.5 G/DL
IMM GRANULOCYTES NFR BLD AUTO: 0.5 %
KETONES URINE: NORMAL
LDLC SERPL CALC-MCNC: 149 MG/DL
LEUKOCYTE ESTERASE URINE: ABNORMAL
LYMPHOCYTES # BLD AUTO: 2.78 K/UL
LYMPHOCYTES NFR BLD AUTO: 25.1 %
MAN DIFF?: NORMAL
MCHC RBC-ENTMCNC: 32.1 GM/DL
MCHC RBC-ENTMCNC: 33.4 PG
MCV RBC AUTO: 104.2 FL
MICROALBUMIN 24H UR DL<=1MG/L-MCNC: 10.6 MG/DL
MICROALBUMIN/CREAT 24H UR-RTO: 40 MG/G
MONOCYTES # BLD AUTO: 1.08 K/UL
MONOCYTES NFR BLD AUTO: 9.8 %
NEUTROPHILS # BLD AUTO: 6.89 K/UL
NEUTROPHILS NFR BLD AUTO: 62.2 %
NITRITE URINE: NEGATIVE
PH URINE: 5.5
PLATELET # BLD AUTO: 449 K/UL
POTASSIUM SERPL-SCNC: 4.7 MMOL/L
PROT SERPL-MCNC: 7.1 G/DL
PROTEIN URINE: ABNORMAL
RBC # BLD: 4.04 M/UL
RBC # FLD: 15.5 %
SODIUM SERPL-SCNC: 144 MMOL/L
SPECIFIC GRAVITY URINE: 1.03
TRIGL SERPL-MCNC: 128 MG/DL
TSH SERPL-ACNC: 2.08 UIU/ML
UROBILINOGEN URINE: ABNORMAL
VIT B12 SERPL-MCNC: 1099 PG/ML
WBC # FLD AUTO: 11.07 K/UL

## 2020-02-28 RX ORDER — CHOLECALCIFEROL (VITAMIN D3) 1250 MCG
1.25 MG CAPSULE ORAL
Qty: 8 | Refills: 1 | Status: ACTIVE | COMMUNITY
Start: 2020-02-28 | End: 1900-01-01

## 2020-02-29 NOTE — HISTORY OF PRESENT ILLNESS
[FreeTextEntry1] : No syncope, palpitations, nausea, vomiting, edema , diarrhea or constipation\par \par Continues to smoke  and drink \par \par Needs blood work today\par \par Denies bleeding from any orifices \par \par Bilateral cataracts

## 2020-02-29 NOTE — ASSESSMENT
[FreeTextEntry1] : generalized anxiety disorder\par \par transaminitis likely secondary to statin use \par \par CAD , stable , marked aortic atherosclerosis \par \par smoker /drinker \par \par cataracts\par \par low folate

## 2020-02-29 NOTE — DISCUSSION/SUMMARY
[Stable] : stable [Coronary Artery Disease] : coronary artery disease [Lipids Test Panel] : a fasting lipid profile [Known CAD] : known coronary artery disease [Hyperlipidemia] : hyperlipidemia [___ Month(s)] : [unfilled] month(s) [Smoking] : smoking [With Me] : with me [de-identified] : patient not motivated to quit smoking  [FreeTextEntry1] : venipuncture performed  with lipids, LFTs, vitamin D, B12/folate  etc \par \par If LFTs normalize,  will clear patient for cataract surgery \par \par major complaint of  right hip pain - physiatry referral provided \par \par reinforce daily use of folate 1mg   [de-identified] : If LDL remains above 100 mg/dL  will consider use of Repatha or Zetia

## 2020-02-29 NOTE — REASON FOR VISIT
[Follow-Up - Clinic] : a clinic follow-up of [Dyspnea] : dyspnea [Hyperlipidemia] : hyperlipidemia [FreeTextEntry1] : 74  year old chronic heavy smoker  with hx of  DCIS of left breast  and  CAD  had developed transaminitis on statins. She has since stopped statins and needs follow up blood work. Also, she is planning on cataract surgery soon. \par \par Resting EKG shows NSR   83  bpm with low voltage  but no ischemic changes  or ectopy. There is family hx of premature CAD.  Had negative nuclear  stress test  August 2017 and  nonobstructive CAD by cath this past July. \par \par She  also  c/o leg pains  and numbness of her hands and feet  but denies hx of thrombophilia \par \par Denies hemoptysis, fevers, syncope, orthopnea, PND, wheezing.  No hematuria, rectal bleeding, strokes or TIAs.

## 2020-02-29 NOTE — PHYSICAL EXAM
[General Appearance - Well Developed] : well developed [Normal Appearance] : normal appearance [General Appearance - Well Nourished] : well nourished [No Deformities] : no deformities [Well Groomed] : well groomed [Eyelids - No Xanthelasma] : the eyelids demonstrated no xanthelasmas [General Appearance - In No Acute Distress] : no acute distress [Normal Conjunctiva] : the conjunctiva exhibited no abnormalities [No Oral Cyanosis] : no oral cyanosis [FreeTextEntry1] : anicteric [Auscultation Breath Sounds / Voice Sounds] : lungs were clear to auscultation bilaterally [Respiration, Rhythm And Depth] : normal respiratory rhythm and effort [Exaggerated Use Of Accessory Muscles For Inspiration] : no accessory muscle use [Heart Rate And Rhythm] : heart rate and rhythm were normal [Heart Sounds] : normal S1 and S2 [Murmurs] : no murmurs present [Edema] : no peripheral edema present [Bowel Sounds] : normal bowel sounds [Abdomen Tenderness] : non-tender [Abdomen Mass (___ Cm)] : no abdominal mass palpated [Abdomen Soft] : soft [Gait - Sufficient For Exercise Testing] : the gait was sufficient for exercise testing [Abnormal Walk] : normal gait [Nail Clubbing] : no clubbing of the fingernails [Petechial Hemorrhages (___cm)] : no petechial hemorrhages [Cyanosis, Localized] : no localized cyanosis [Nail Splinter Hemorrhages] : no splinter hemorrhages of the nails [Skin Color & Pigmentation] : normal skin color and pigmentation [Fingers Osler's Nodes] : Osler's nodes were not seenon the fingers [] : no rash [Skin Turgor] : normal skin turgor [No Venous Stasis] : no venous stasis [No Xanthoma] : no  xanthoma was observed [No Skin Ulcers] : no skin ulcer [Oriented To Time, Place, And Person] : oriented to person, place, and time [Mood] : the mood was normal [Memory Recent] : recent memory was not impaired [Affect] : the affect was normal [Memory Remote] : remote memory was not impaired

## 2020-02-29 NOTE — REVIEW OF SYSTEMS
[Feeling Fatigued] : feeling fatigued [Blurry Vision] : blurred vision [Dyspnea on exertion] : dyspnea during exertion [Leg Claudication] : intermittent leg claudication [Anxiety] : anxiety [Negative] : Heme/Lymph

## 2020-03-03 DIAGNOSIS — G62.9 POLYNEUROPATHY, UNSPECIFIED: ICD-10-CM

## 2020-03-05 RX ORDER — CHOLECALCIFEROL (VITAMIN D3) 1250 MCG
1.25 MG CAPSULE ORAL
Qty: 10 | Refills: 1 | Status: ACTIVE | COMMUNITY
Start: 2020-03-05 | End: 1900-01-01

## 2020-05-05 ENCOUNTER — APPOINTMENT (OUTPATIENT)
Dept: HEART AND VASCULAR | Facility: CLINIC | Age: 75
End: 2020-05-05
Payer: MEDICARE

## 2020-05-05 DIAGNOSIS — E78.5 HYPERLIPIDEMIA, UNSPECIFIED: ICD-10-CM

## 2020-05-05 PROCEDURE — 99442: CPT | Mod: CR

## 2020-05-07 ENCOUNTER — APPOINTMENT (OUTPATIENT)
Dept: HEART AND VASCULAR | Facility: CLINIC | Age: 75
End: 2020-05-07
Payer: MEDICARE

## 2020-05-07 VITALS
TEMPERATURE: 99.1 F | HEART RATE: 102 BPM | HEIGHT: 65 IN | WEIGHT: 120 LBS | DIASTOLIC BLOOD PRESSURE: 74 MMHG | SYSTOLIC BLOOD PRESSURE: 120 MMHG | BODY MASS INDEX: 19.99 KG/M2

## 2020-05-07 DIAGNOSIS — E55.9 VITAMIN D DEFICIENCY, UNSPECIFIED: ICD-10-CM

## 2020-05-07 DIAGNOSIS — R74.0 NONSPECIFIC ELEVATION OF LEVELS OF TRANSAMINASE AND LACTIC ACID DEHYDROGENASE [LDH]: ICD-10-CM

## 2020-05-07 DIAGNOSIS — E53.8 DEFICIENCY OF OTHER SPECIFIED B GROUP VITAMINS: ICD-10-CM

## 2020-05-07 PROCEDURE — 36415 COLL VENOUS BLD VENIPUNCTURE: CPT

## 2020-05-07 PROCEDURE — 99213 OFFICE O/P EST LOW 20 MIN: CPT

## 2020-05-07 NOTE — REVIEW OF SYSTEMS
[Feeling Fatigued] : not feeling fatigued [Dyspnea on exertion] : dyspnea during exertion [Blurry Vision] : blurred vision [Leg Claudication] : intermittent leg claudication [Anxiety] : anxiety [Heartburn] : heartburn [Negative] : Heme/Lymph [FreeTextEntry1] : tongue discomfort

## 2020-05-07 NOTE — ASSESSMENT
[FreeTextEntry1] : B12/folate deficiency\par \par hypovitaminosis D \par \par smoker, chronic\par \par oral thrush secondary to regular use of flonase

## 2020-05-07 NOTE — HISTORY OF PRESENT ILLNESS
[FreeTextEntry1] : Today she requires lab tests to assess  folate/B12,  vitamin D which she has been supplementing\par \par \par Since using flonase spray bid, she developed  oral thrush and requests  treatment .  Increased cough, white tongue with mild tenderness of the tongue \par \par \par Low grade fever 99.6 today\par \par No known Covid 19 exposure  but would like to have Covid 19 antibody testing

## 2020-05-07 NOTE — DISCUSSION/SUMMARY
[With Me] : with me [FreeTextEntry1] : venipuncture performed with  vitamin D, folate/B12\par \par \par Nystatin swish and swallow 5 mls  tid for 5 to 7 days  for oral thrush\par \par Rx provided for Covid 19 antibody testing

## 2020-05-07 NOTE — REASON FOR VISIT
[Coronary Artery Disease] : coronary artery disease [Follow-Up - Clinic] : a clinic follow-up of [Hyperlipidemia] : hyperlipidemia [FreeTextEntry1] : 74  year old chronic heavy smoker  with hx of  DCIS of left breast  and  CAD  had developed transaminitis on statins. She has since stopped statins and LFTs normalized. \par \par  She  plans on cataract surgery soon. \par \par Previous  EKG shows NSR   83  bpm with low voltage  but no ischemic changes  or ectopy. There is family hx of premature CAD.  Had negative nuclear  stress test  August 2017 and  nonobstructive CAD by cath last July. \par \par She  also  c/o leg pains  and numbness of her hands and feet  but denies hx of thrombophilia \par \par Denies hemoptysis, fevers, syncope, orthopnea, PND, wheezing.  No hematuria, rectal bleeding, strokes or TIAs.

## 2020-05-07 NOTE — PHYSICAL EXAM
[Normal Appearance] : normal appearance [General Appearance - Well Developed] : well developed [General Appearance - Well Nourished] : well nourished [Well Groomed] : well groomed [No Deformities] : no deformities [Normal Conjunctiva] : the conjunctiva exhibited no abnormalities [General Appearance - In No Acute Distress] : no acute distress [No Oral Cyanosis] : no oral cyanosis [Eyelids - No Xanthelasma] : the eyelids demonstrated no xanthelasmas [FreeTextEntry1] : no JVD [Respiration, Rhythm And Depth] : normal respiratory rhythm and effort [Auscultation Breath Sounds / Voice Sounds] : lungs were clear to auscultation bilaterally [Exaggerated Use Of Accessory Muscles For Inspiration] : no accessory muscle use [Heart Rate And Rhythm] : heart rate and rhythm were normal [Heart Sounds] : normal S1 and S2 [Murmurs] : no murmurs present [Edema] : no peripheral edema present [Abnormal Walk] : normal gait [Gait - Sufficient For Exercise Testing] : the gait was sufficient for exercise testing [Nail Clubbing] : no clubbing of the fingernails [Cyanosis, Localized] : no localized cyanosis [Petechial Hemorrhages (___cm)] : no petechial hemorrhages [Nail Splinter Hemorrhages] : no splinter hemorrhages of the nails [] : no ischemic changes [Skin Color & Pigmentation] : normal skin color and pigmentation [Fingers Osler's Nodes] : Osler's nodes were not seenon the fingers [Skin Turgor] : normal skin turgor [No Venous Stasis] : no venous stasis [No Skin Ulcers] : no skin ulcer [No Xanthoma] : no  xanthoma was observed [Oriented To Time, Place, And Person] : oriented to person, place, and time [Affect] : the affect was normal [Mood] : the mood was normal [Memory Recent] : recent memory was not impaired [Memory Remote] : remote memory was not impaired

## 2020-05-08 LAB
25(OH)D3 SERPL-MCNC: 94.4 NG/ML
ALBUMIN SERPL ELPH-MCNC: 4.8 G/DL
ALP BLD-CCNC: 90 U/L
ALT SERPL-CCNC: 18 U/L
ANION GAP SERPL CALC-SCNC: 23 MMOL/L
AST SERPL-CCNC: 42 U/L
BASOPHILS # BLD AUTO: 0.15 K/UL
BASOPHILS NFR BLD AUTO: 1 %
BILIRUB SERPL-MCNC: 0.7 MG/DL
BUN SERPL-MCNC: 16 MG/DL
CALCIUM SERPL-MCNC: 10.2 MG/DL
CHLORIDE SERPL-SCNC: 96 MMOL/L
CO2 SERPL-SCNC: 19 MMOL/L
CREAT SERPL-MCNC: 0.84 MG/DL
EOSINOPHIL # BLD AUTO: 0.06 K/UL
EOSINOPHIL NFR BLD AUTO: 0.4 %
FOLATE SERPL-MCNC: 8.8 NG/ML
HCT VFR BLD CALC: 41.4 %
HGB BLD-MCNC: 13.1 G/DL
IMM GRANULOCYTES NFR BLD AUTO: 0.5 %
LYMPHOCYTES # BLD AUTO: 3.31 K/UL
LYMPHOCYTES NFR BLD AUTO: 22.1 %
MAN DIFF?: NORMAL
MCHC RBC-ENTMCNC: 31.6 GM/DL
MCHC RBC-ENTMCNC: 32.3 PG
MCV RBC AUTO: 102.2 FL
MONOCYTES # BLD AUTO: 1.4 K/UL
MONOCYTES NFR BLD AUTO: 9.3 %
NEUTROPHILS # BLD AUTO: 9.99 K/UL
NEUTROPHILS NFR BLD AUTO: 66.7 %
PLATELET # BLD AUTO: 524 K/UL
POTASSIUM SERPL-SCNC: 5.3 MMOL/L
PROT SERPL-MCNC: 7.7 G/DL
RBC # BLD: 4.05 M/UL
RBC # FLD: 15.9 %
SODIUM SERPL-SCNC: 138 MMOL/L
VIT B12 SERPL-MCNC: 1290 PG/ML
WBC # FLD AUTO: 14.98 K/UL

## 2020-06-12 ENCOUNTER — LABORATORY RESULT (OUTPATIENT)
Age: 75
End: 2020-06-12

## 2020-06-13 LAB
SARS-COV-2 IGG SERPL IA-ACNC: <0.1 INDEX
SARS-COV-2 IGG SERPL QL IA: NEGATIVE

## 2020-08-28 ENCOUNTER — RX RENEWAL (OUTPATIENT)
Age: 75
End: 2020-08-28

## 2020-09-15 DIAGNOSIS — M25.551 PAIN IN RIGHT HIP: ICD-10-CM

## 2020-09-15 DIAGNOSIS — G89.29 PAIN IN RIGHT HIP: ICD-10-CM

## 2020-10-02 ENCOUNTER — APPOINTMENT (OUTPATIENT)
Dept: MRI IMAGING | Facility: HOSPITAL | Age: 75
End: 2020-10-02
Payer: MEDICARE

## 2020-10-02 ENCOUNTER — OUTPATIENT (OUTPATIENT)
Dept: OUTPATIENT SERVICES | Facility: HOSPITAL | Age: 75
LOS: 1 days | End: 2020-10-02
Payer: MEDICARE

## 2020-10-02 DIAGNOSIS — Z90.711 ACQUIRED ABSENCE OF UTERUS WITH REMAINING CERVICAL STUMP: Chronic | ICD-10-CM

## 2020-10-02 DIAGNOSIS — Z98.890 OTHER SPECIFIED POSTPROCEDURAL STATES: Chronic | ICD-10-CM

## 2020-10-02 PROCEDURE — 70549 MR ANGIOGRAPH NECK W/O&W/DYE: CPT | Mod: 26

## 2020-10-02 PROCEDURE — 70544 MR ANGIOGRAPHY HEAD W/O DYE: CPT | Mod: 26,59

## 2020-10-02 PROCEDURE — 70544 MR ANGIOGRAPHY HEAD W/O DYE: CPT

## 2020-10-02 PROCEDURE — 70551 MRI BRAIN STEM W/O DYE: CPT | Mod: 26

## 2020-10-02 PROCEDURE — 70549 MR ANGIOGRAPH NECK W/O&W/DYE: CPT

## 2020-10-02 PROCEDURE — 70551 MRI BRAIN STEM W/O DYE: CPT

## 2020-10-02 PROCEDURE — A9585: CPT

## 2020-10-05 ENCOUNTER — APPOINTMENT (OUTPATIENT)
Dept: HEART AND VASCULAR | Facility: CLINIC | Age: 75
End: 2020-10-05
Payer: MEDICARE

## 2020-10-05 VITALS
DIASTOLIC BLOOD PRESSURE: 50 MMHG | WEIGHT: 120 LBS | SYSTOLIC BLOOD PRESSURE: 110 MMHG | TEMPERATURE: 97.7 F | OXYGEN SATURATION: 96 % | BODY MASS INDEX: 19.97 KG/M2 | HEART RATE: 96 BPM

## 2020-10-05 DIAGNOSIS — R00.2 PALPITATIONS: ICD-10-CM

## 2020-10-05 PROCEDURE — 99214 OFFICE O/P EST MOD 30 MIN: CPT

## 2020-10-05 PROCEDURE — 93306 TTE W/DOPPLER COMPLETE: CPT

## 2020-10-13 PROBLEM — R00.2 PALPITATIONS: Status: ACTIVE | Noted: 2017-08-14

## 2020-10-13 NOTE — HISTORY OF PRESENT ILLNESS
[FreeTextEntry1] : recent complaints of increasing exertional dyspnea\par \par had EGD and colonoscopy late August : mid esophageal adenocarcinoma in situ  , colon tubular adenomas \par No evidence of celiac disease on biopsies\par \par Reports a TIA like syndrome  with negative work up, MRI, MRA of head and neck  but holter/event recorder was not performed \par \par Smokes and drinks too much alcohol \par \par Requires echocardiogram today

## 2020-10-13 NOTE — ASSESSMENT
[FreeTextEntry1] : ? carcinoma in situ mid esophagus \par \par dyspnea secondary to COPD  , smoking \par \par ? TIA syndrome vs  alcohol side effect

## 2020-10-13 NOTE — REASON FOR VISIT
[Follow-Up - Clinic] : a clinic follow-up of [Dyspnea] : dyspnea [FreeTextEntry1] : 75  year old chronic heavy smoker  with hx of  DCIS of left breast  and  CAD  had developed transaminitis on statins. She has since stopped statins and LFTs normalized. \par \par Previous  EKG shows NSR   83  bpm with low voltage  but no ischemic changes  or ectopy. There is family hx of premature CAD.  Had negative nuclear  stress test  August 2017 and  nonobstructive CAD by cath last July. \par \par She  also  c/o leg pains  and numbness of her hands and feet  but denies hx of thrombophilia \par \par Denies hemoptysis, fevers, syncope, orthopnea, PND, wheezing.  No hematuria, rectal bleeding, strokes or TIAs.

## 2020-10-13 NOTE — REVIEW OF SYSTEMS
[Dyspnea on exertion] : dyspnea during exertion [Leg Claudication] : intermittent leg claudication [Heartburn] : heartburn [Anxiety] : anxiety [Negative] : Heme/Lymph [Feeling Fatigued] : not feeling fatigued [Blurry Vision] : no blurred vision

## 2020-10-13 NOTE — PHYSICAL EXAM
[General Appearance - Well Developed] : well developed [Normal Appearance] : normal appearance [Well Groomed] : well groomed [General Appearance - Well Nourished] : well nourished [No Deformities] : no deformities [General Appearance - In No Acute Distress] : no acute distress [Normal Conjunctiva] : the conjunctiva exhibited no abnormalities [Eyelids - No Xanthelasma] : the eyelids demonstrated no xanthelasmas [Respiration, Rhythm And Depth] : normal respiratory rhythm and effort [Exaggerated Use Of Accessory Muscles For Inspiration] : no accessory muscle use [Auscultation Breath Sounds / Voice Sounds] : lungs were clear to auscultation bilaterally [Heart Rate And Rhythm] : heart rate and rhythm were normal [Heart Sounds] : normal S1 and S2 [Murmurs] : no murmurs present [Edema] : no peripheral edema present [Abnormal Walk] : normal gait [Gait - Sufficient For Exercise Testing] : the gait was sufficient for exercise testing [Nail Clubbing] : no clubbing of the fingernails [Cyanosis, Localized] : no localized cyanosis [Petechial Hemorrhages (___cm)] : no petechial hemorrhages [Nail Splinter Hemorrhages] : no splinter hemorrhages of the nails [] : no ischemic changes [Fingers Osler's Nodes] : Osler's nodes were not seenon the fingers [Skin Color & Pigmentation] : normal skin color and pigmentation [Skin Turgor] : normal skin turgor [No Venous Stasis] : no venous stasis [No Skin Ulcers] : no skin ulcer [No Xanthoma] : no  xanthoma was observed [Oriented To Time, Place, And Person] : oriented to person, place, and time [Affect] : the affect was normal [Mood] : the mood was normal [Memory Recent] : recent memory was not impaired [Memory Remote] : remote memory was not impaired [FreeTextEntry1] : no JVD

## 2020-10-13 NOTE — DISCUSSION/SUMMARY
[FreeTextEntry1] : echocardiogram results reviewed with patient\par \par \par R/o PAF  - set up 14 day event recorder\par \par \par \par \par

## 2020-10-30 ENCOUNTER — RX RENEWAL (OUTPATIENT)
Age: 75
End: 2020-10-30

## 2020-11-04 ENCOUNTER — APPOINTMENT (OUTPATIENT)
Dept: HEART AND VASCULAR | Facility: CLINIC | Age: 75
End: 2020-11-04
Payer: MEDICARE

## 2020-11-04 ENCOUNTER — LABORATORY RESULT (OUTPATIENT)
Age: 75
End: 2020-11-04

## 2020-11-04 VITALS
HEART RATE: 92 BPM | BODY MASS INDEX: 20.49 KG/M2 | TEMPERATURE: 94.6 F | DIASTOLIC BLOOD PRESSURE: 78 MMHG | WEIGHT: 120 LBS | SYSTOLIC BLOOD PRESSURE: 122 MMHG | HEIGHT: 64 IN | RESPIRATION RATE: 20 BRPM | OXYGEN SATURATION: 98 %

## 2020-11-04 DIAGNOSIS — R94.31 ABNORMAL ELECTROCARDIOGRAM [ECG] [EKG]: ICD-10-CM

## 2020-11-04 DIAGNOSIS — R06.00 DYSPNEA, UNSPECIFIED: ICD-10-CM

## 2020-11-04 PROCEDURE — 36415 COLL VENOUS BLD VENIPUNCTURE: CPT

## 2020-11-04 PROCEDURE — 99214 OFFICE O/P EST MOD 30 MIN: CPT | Mod: 57

## 2020-11-04 PROCEDURE — 93000 ELECTROCARDIOGRAM COMPLETE: CPT | Mod: NC

## 2020-11-05 ENCOUNTER — APPOINTMENT (OUTPATIENT)
Dept: OTOLARYNGOLOGY | Facility: CLINIC | Age: 75
End: 2020-11-05
Payer: MEDICARE

## 2020-11-05 ENCOUNTER — LABORATORY RESULT (OUTPATIENT)
Age: 75
End: 2020-11-05

## 2020-11-05 VITALS — WEIGHT: 120 LBS | HEIGHT: 64 IN | BODY MASS INDEX: 20.49 KG/M2

## 2020-11-05 VITALS
OXYGEN SATURATION: 96 % | HEART RATE: 85 BPM | TEMPERATURE: 98.3 F | DIASTOLIC BLOOD PRESSURE: 83 MMHG | SYSTOLIC BLOOD PRESSURE: 132 MMHG

## 2020-11-05 DIAGNOSIS — R91.1 SOLITARY PULMONARY NODULE: ICD-10-CM

## 2020-11-05 DIAGNOSIS — Z72.89 OTHER PROBLEMS RELATED TO LIFESTYLE: ICD-10-CM

## 2020-11-05 DIAGNOSIS — Z86.018 PERSONAL HISTORY OF OTHER BENIGN NEOPLASM: ICD-10-CM

## 2020-11-05 DIAGNOSIS — Z78.9 OTHER SPECIFIED HEALTH STATUS: ICD-10-CM

## 2020-11-05 PROCEDURE — 31575 DIAGNOSTIC LARYNGOSCOPY: CPT

## 2020-11-05 PROCEDURE — 99205 OFFICE O/P NEW HI 60 MIN: CPT | Mod: 25

## 2020-11-05 PROCEDURE — 41100 BIOPSY OF TONGUE: CPT

## 2020-11-05 RX ORDER — ASPIRIN 325 MG/1
TABLET, FILM COATED ORAL
Refills: 0 | Status: ACTIVE | COMMUNITY

## 2020-11-05 NOTE — REASON FOR VISIT
[FreeTextEntry2] : possible right lateral tongue lesion, Hx of Insitu scca of esophagus and DCIS of breast. [FreeTextEntry1] : Kira Stanley MD PCP and referrer

## 2020-11-05 NOTE — HISTORY OF PRESENT ILLNESS
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  She has been evaluated for IBS. 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 She was noted to have a depressed lesion in the right lateral posterior tongue noted by her gastroenterologist, Dr. De Los Santos.  She reports pain when chewing after an upper right molar tooth this past summer with continued irritation. She has no bleeding or dysphagia/dysarthria. She has chronic hoarseness and not able to quit smoking. She may have bitten down on the tongue after the extraction but she is not certain. She has non-suspicious pulmonary nodules and a PET/CT done on 9/17/2020 did not reveal any hypermetabolic activity in the neck. She also has moderate sleep apnea and uses a CPAP machine intermittently.

## 2020-11-05 NOTE — CONSULT LETTER
[Dear  ___] : Dear  [unfilled], [Consult Letter:] : I had the pleasure of evaluating your patient, [unfilled]. [Please see my note below.] : Please see my note below. [Consult Closing:] : Thank you very much for allowing me to participate in the care of this patient.  If you have any questions, please do not hesitate to contact me. [Sincerely,] : Sincerely, [DrRosalie  ___] : Dr. TILLMAN [FreeTextEntry3] : \par Rahul Del Rosario M.D., FACS, ECNU\par Director Center [facility] Thyroid [facility] Parathyroid Surgery\par [facility]\par Certified in Thyroid/Parathyroid/Neck Ultrasound, ECNU/ AIUM\par \par , Department of Otolaryngology\par [employer] [facility] [employer] at [facility]\par

## 2020-11-05 NOTE — PROCEDURE
[Image(s) Captured] : image(s) captured and filed [Unable to Cooperate with Mirror] : patient unable to cooperate with mirror [Gag Reflex] : gag reflex preventing mirror examination [Hoarseness] : hoarseness not clearly evaluated by indirect laryngoscopy [Complicated Symptoms] : complicated symptoms requiring more thorough examination than provided by mirror [Topical Lidocaine] : topical lidocaine [Oxymetazoline HCl] : oxymetazoline HCl [Flexible Endoscope] : examined with the flexible endoscope [Serial Number: ___] : Serial Number: [unfilled] [FreeTextEntry1] : Punch biopsy of oral tongue lesion/neoplasm [FreeTextEntry2] : Right lateral tongue lesion/ulceration, non-healing. [FreeTextEntry3] : After signing informed consent with discussion of risks, benefits and alternatives, the mucosa was topically anesthetized with Lidocaine cream followed by 1/2 cc 1% Lidocaine.  A 5 mm dermal punch was used to make a 2 mm mucosal incision down to the muscle and tenotomy scissors used to divide the deep muscle margin.  Silver nitrate was used for hemostasis and the defect closed with three 3.0 absorbable Monocryl sutures.  Ice was applied. Post procedure instructions given.  [de-identified] : The nasal septum is minimally deviated to the right anteriorly. There are no masses or polyps and the nasal mucosa and secretions are normal. The choanae and posterior nasopharynx are normal without masses or drainage. The Eustachian tube orifices appear patent. The pharynx, including the posterior and lateral pharyngeal walls, the vallecula and base of tongue are normal without ulcerations, lesions or masses. The hypopharynx including the pyriform sinuses open well without pooling of secretions, mucosal lesions or masses. The supraglottic larynx including the epiglottis, petiole, arytenoids, glossoepiglottic, aryepiglottic and pharyngoepiglottic folds are normal without mucosal lesions, ulcerations or masses. The glottis reveals normal false vocal folds. The true vocal folds are glistening white, tense and of equal length, without paralysis, having symmetric mobility on adduction and abduction. There are floppy, sessile edematous polyps without erythroplasia or leukoplakia. The posterior cricoid area has healthy pink mucosa in the interarytenoid area and esophageal inlet. There is moderate thickening/edema of the interarytenoid mucosa suggestive of posterior laryngitis from laryngopharyngeal acid reflux disease. The trachea is clear without narrowing in the immediate subglottic region, without deviation or lesions. \par  [de-identified] : tongue lesion

## 2020-11-07 PROBLEM — R06.00 DYSPNEA: Status: ACTIVE | Noted: 2017-08-14

## 2020-11-07 PROBLEM — R94.31 NONSPECIFIC ABNORMAL ELECTROCARDIOGRAM (ECG) (EKG): Status: ACTIVE | Noted: 2019-06-02

## 2020-11-07 NOTE — ASSESSMENT
[FreeTextEntry1] : mid esophageal submucosal squamous cell carcinoma \par \par chronic heavy smoker/alcohol abuser \par \par suspicious right sided tongue lesion \par \par moderate , nonobstructive CAD\par \par COPD

## 2020-11-07 NOTE — PHYSICAL EXAM
[General Appearance - Well Developed] : well developed [Normal Appearance] : normal appearance [Well Groomed] : well groomed [General Appearance - Well Nourished] : well nourished [No Deformities] : no deformities [General Appearance - In No Acute Distress] : no acute distress [Normal Conjunctiva] : the conjunctiva exhibited no abnormalities [Eyelids - No Xanthelasma] : the eyelids demonstrated no xanthelasmas [No Oral Pallor] : no oral pallor [No Oral Cyanosis] : no oral cyanosis [FreeTextEntry1] : no JVD [Respiration, Rhythm And Depth] : normal respiratory rhythm and effort [Exaggerated Use Of Accessory Muscles For Inspiration] : no accessory muscle use [Auscultation Breath Sounds / Voice Sounds] : lungs were clear to auscultation bilaterally [Heart Rate And Rhythm] : heart rate and rhythm were normal [Heart Sounds] : normal S1 and S2 [Murmurs] : no murmurs present [Edema] : no peripheral edema present [Abnormal Walk] : normal gait [Gait - Sufficient For Exercise Testing] : the gait was sufficient for exercise testing [Nail Clubbing] : no clubbing of the fingernails [Cyanosis, Localized] : no localized cyanosis [Petechial Hemorrhages (___cm)] : no petechial hemorrhages [Nail Splinter Hemorrhages] : no splinter hemorrhages of the nails [] : no ischemic changes [Fingers Osler's Nodes] : Osler's nodes were not seenon the fingers [Skin Color & Pigmentation] : normal skin color and pigmentation [Skin Turgor] : normal skin turgor [No Venous Stasis] : no venous stasis [No Skin Ulcers] : no skin ulcer [No Xanthoma] : no  xanthoma was observed [Oriented To Time, Place, And Person] : oriented to person, place, and time [Affect] : the affect was normal [Mood] : the mood was normal [Memory Recent] : recent memory was not impaired [Memory Remote] : remote memory was not impaired

## 2020-11-07 NOTE — DISCUSSION/SUMMARY
[Procedure Intermediate Risk] : the procedure risk is intermediate [Patient Low Risk] : the patient is a low surgical risk [Optimized for Surgery] : the patient is optimized for surgery [FreeTextEntry3] : stop aspirin  one week prior to surgery  [FreeTextEntry1] : See ENT specialist , Rahul Del Rosario MD regarding  right sided tongue lesion \par \par

## 2020-11-07 NOTE — REVIEW OF SYSTEMS
[Feeling Fatigued] : not feeling fatigued [Blurry Vision] : no blurred vision [Mouth Sores] : mouth sores [Dyspnea on exertion] : dyspnea during exertion [Leg Claudication] : intermittent leg claudication [Dysphagia] : dysphagia [Anxiety] : anxiety [Negative] : Heme/Lymph

## 2020-11-08 LAB
ALBUMIN SERPL ELPH-MCNC: 4.4 G/DL
ALP BLD-CCNC: 82 U/L
ALT SERPL-CCNC: 8 U/L
ANION GAP SERPL CALC-SCNC: 14 MMOL/L
APPEARANCE: ABNORMAL
APPEARANCE: ABNORMAL
APTT BLD: 36 SEC
AST SERPL-CCNC: 18 U/L
BASOPHILS # BLD AUTO: 0.19 K/UL
BASOPHILS NFR BLD AUTO: 1.2 %
BILIRUB SERPL-MCNC: 0.5 MG/DL
BILIRUBIN URINE: NEGATIVE
BILIRUBIN URINE: NEGATIVE
BLOOD URINE: NEGATIVE
BLOOD URINE: NEGATIVE
BUN SERPL-MCNC: 7 MG/DL
CALCIUM SERPL-MCNC: 9.7 MG/DL
CHLORIDE SERPL-SCNC: 99 MMOL/L
CHOLEST SERPL-MCNC: 200 MG/DL
CO2 SERPL-SCNC: 24 MMOL/L
COLOR: YELLOW
COLOR: YELLOW
CREAT SERPL-MCNC: 0.8 MG/DL
EOSINOPHIL # BLD AUTO: 0.25 K/UL
EOSINOPHIL NFR BLD AUTO: 1.6 %
GLUCOSE QUALITATIVE U: NEGATIVE
GLUCOSE QUALITATIVE U: NEGATIVE
GLUCOSE SERPL-MCNC: 104 MG/DL
HCT VFR BLD CALC: 38.3 %
HDLC SERPL-MCNC: 66 MG/DL
HGB BLD-MCNC: 11.8 G/DL
IMM GRANULOCYTES NFR BLD AUTO: 0.5 %
INR PPP: 0.98 RATIO
KETONES URINE: NORMAL
KETONES URINE: NORMAL
LDLC SERPL CALC-MCNC: 115 MG/DL
LEUKOCYTE ESTERASE URINE: NEGATIVE
LEUKOCYTE ESTERASE URINE: NEGATIVE
LYMPHOCYTES # BLD AUTO: 2.38 K/UL
LYMPHOCYTES NFR BLD AUTO: 15.4 %
MAN DIFF?: NORMAL
MCHC RBC-ENTMCNC: 30.8 GM/DL
MCHC RBC-ENTMCNC: 32.4 PG
MCV RBC AUTO: 105.2 FL
MONOCYTES # BLD AUTO: 1.92 K/UL
MONOCYTES NFR BLD AUTO: 12.4 %
NEUTROPHILS # BLD AUTO: 10.68 K/UL
NEUTROPHILS NFR BLD AUTO: 68.9 %
NITRITE URINE: NEGATIVE
NITRITE URINE: NEGATIVE
NONHDLC SERPL-MCNC: 134 MG/DL
PH URINE: 6
PH URINE: 6
PLATELET # BLD AUTO: 703 K/UL
POTASSIUM SERPL-SCNC: 4.6 MMOL/L
PROT SERPL-MCNC: 6.9 G/DL
PROTEIN URINE: NEGATIVE
PROTEIN URINE: NEGATIVE
PT BLD: 11.6 SEC
RBC # BLD: 3.64 M/UL
RBC # FLD: 16.5 %
SARS-COV-2 IGG SERPL IA-ACNC: 0.39 RATIO
SARS-COV-2 IGG SERPL QL IA: NEGATIVE
SODIUM SERPL-SCNC: 137 MMOL/L
SPECIFIC GRAVITY URINE: 1.01
SPECIFIC GRAVITY URINE: 1.01
TRIGL SERPL-MCNC: 94 MG/DL
TSH SERPL-ACNC: 2.64 UIU/ML
UROBILINOGEN URINE: NORMAL
UROBILINOGEN URINE: NORMAL
WBC # FLD AUTO: 15.5 K/UL

## 2020-11-16 ENCOUNTER — APPOINTMENT (OUTPATIENT)
Dept: HEART AND VASCULAR | Facility: CLINIC | Age: 75
End: 2020-11-16

## 2020-11-16 ENCOUNTER — APPOINTMENT (OUTPATIENT)
Dept: PULMONOLOGY | Facility: CLINIC | Age: 75
End: 2020-11-16
Payer: MEDICARE

## 2020-11-16 VITALS
TEMPERATURE: 97.9 F | HEIGHT: 64 IN | HEART RATE: 120 BPM | SYSTOLIC BLOOD PRESSURE: 135 MMHG | WEIGHT: 120 LBS | OXYGEN SATURATION: 98 % | DIASTOLIC BLOOD PRESSURE: 72 MMHG | BODY MASS INDEX: 20.49 KG/M2

## 2020-11-16 PROCEDURE — 94010 BREATHING CAPACITY TEST: CPT

## 2020-11-16 PROCEDURE — 99214 OFFICE O/P EST MOD 30 MIN: CPT | Mod: 25

## 2020-11-17 NOTE — DISCUSSION/SUMMARY
[FreeTextEntry1] : despite the myriad of smoking related diseases she does not have COPD!!\par \par cleared for surgery

## 2020-11-17 NOTE — HISTORY OF PRESENT ILLNESS
[TextBox_4] : here pre op clearance  incessant smoker\par \par squamous cell esophatus and also base of tongue cancer\par \par with all this and her smoking she does not have copd!!

## 2020-11-17 NOTE — PHYSICAL EXAM
[No Acute Distress] : no acute distress [Normal Oropharynx] : normal oropharynx [Normal Appearance] : normal appearance [No Neck Mass] : no neck mass [Normal Rate/Rhythm] : normal rate/rhythm [Normal S1, S2] : normal s1, s2 [No Murmurs] : no murmurs [No Resp Distress] : no resp distress [Clear to Auscultation Bilaterally] : clear to auscultation bilaterally [No Abnormalities] : no abnormalities [Normal Gait] : normal gait [No Focal Deficits] : no focal deficits [No Motor Deficits] : no motor deficits [Oriented x3] : oriented x3 [Normal Affect] : normal affect

## 2020-11-23 ENCOUNTER — APPOINTMENT (OUTPATIENT)
Dept: OTOLARYNGOLOGY | Facility: CLINIC | Age: 75
End: 2020-11-23

## 2020-11-23 RX ORDER — PANTOPRAZOLE 40 MG/1
40 TABLET, DELAYED RELEASE ORAL
Qty: 60 | Refills: 0 | Status: ACTIVE | COMMUNITY
Start: 2020-11-20

## 2020-11-30 ENCOUNTER — APPOINTMENT (OUTPATIENT)
Dept: OTOLARYNGOLOGY | Facility: CLINIC | Age: 75
End: 2020-11-30
Payer: MEDICARE

## 2020-11-30 DIAGNOSIS — Z01.818 ENCOUNTER FOR OTHER PREPROCEDURAL EXAMINATION: ICD-10-CM

## 2020-11-30 PROCEDURE — ZZZZZ: CPT

## 2020-12-01 ENCOUNTER — TRANSCRIPTION ENCOUNTER (OUTPATIENT)
Age: 75
End: 2020-12-01

## 2020-12-01 VITALS
RESPIRATION RATE: 16 BRPM | HEIGHT: 64 IN | SYSTOLIC BLOOD PRESSURE: 118 MMHG | WEIGHT: 122.36 LBS | TEMPERATURE: 97 F | OXYGEN SATURATION: 97 % | HEART RATE: 89 BPM | DIASTOLIC BLOOD PRESSURE: 70 MMHG

## 2020-12-01 LAB — SARS-COV-2 N GENE NPH QL NAA+PROBE: NOT DETECTED

## 2020-12-01 NOTE — ASU PATIENT PROFILE, ADULT - PSH
History of esophageal surgery    History of partial hysterectomy  with oopharectomy and fibroidectomy  History of tonsillectomy    S/P lumpectomy, left breast

## 2020-12-01 NOTE — ASU PATIENT PROFILE, ADULT - PMH
Anxiety and depression    Breast cancer  L sided DCIS  CAD (coronary artery disease)    Celiac disease    COPD (chronic obstructive pulmonary disease)    Dyslipidemia    Esophageal cancer    Hypertension    Malignant neoplasm of tongue    WAQAR (obstructive sleep apnea)

## 2020-12-02 ENCOUNTER — APPOINTMENT (OUTPATIENT)
Dept: OTOLARYNGOLOGY | Facility: HOSPITAL | Age: 75
End: 2020-12-02

## 2020-12-02 ENCOUNTER — RESULT REVIEW (OUTPATIENT)
Age: 75
End: 2020-12-02

## 2020-12-02 ENCOUNTER — OUTPATIENT (OUTPATIENT)
Dept: OUTPATIENT SERVICES | Facility: HOSPITAL | Age: 75
LOS: 1 days | Discharge: HOME | End: 2020-12-02
Payer: MEDICARE

## 2020-12-02 VITALS
DIASTOLIC BLOOD PRESSURE: 77 MMHG | SYSTOLIC BLOOD PRESSURE: 144 MMHG | HEART RATE: 71 BPM | RESPIRATION RATE: 16 BRPM | OXYGEN SATURATION: 95 % | TEMPERATURE: 98 F

## 2020-12-02 DIAGNOSIS — Z90.711 ACQUIRED ABSENCE OF UTERUS WITH REMAINING CERVICAL STUMP: Chronic | ICD-10-CM

## 2020-12-02 DIAGNOSIS — Z98.890 OTHER SPECIFIED POSTPROCEDURAL STATES: Chronic | ICD-10-CM

## 2020-12-02 DIAGNOSIS — Z90.89 ACQUIRED ABSENCE OF OTHER ORGANS: Chronic | ICD-10-CM

## 2020-12-02 PROCEDURE — 88307 TISSUE EXAM BY PATHOLOGIST: CPT

## 2020-12-02 PROCEDURE — 88305 TISSUE EXAM BY PATHOLOGIST: CPT | Mod: 26

## 2020-12-02 PROCEDURE — 31505 DIAGNOSTIC LARYNGOSCOPY: CPT | Mod: GC

## 2020-12-02 PROCEDURE — 41130 PARTIAL REMOVAL OF TONGUE: CPT

## 2020-12-02 PROCEDURE — 88305 TISSUE EXAM BY PATHOLOGIST: CPT

## 2020-12-02 PROCEDURE — 31525 DX LARYNGOSCOPY EXCL NB: CPT

## 2020-12-02 PROCEDURE — 41120 PARTIAL REMOVAL OF TONGUE: CPT

## 2020-12-02 PROCEDURE — 88307 TISSUE EXAM BY PATHOLOGIST: CPT | Mod: 26

## 2020-12-02 RX ORDER — DEXAMETHASONE 0.5 MG/5ML
10 ELIXIR ORAL ONCE
Refills: 0 | Status: DISCONTINUED | OUTPATIENT
Start: 2020-12-02 | End: 2020-12-02

## 2020-12-02 RX ORDER — OXYCODONE AND ACETAMINOPHEN 5; 325 MG/1; MG/1
1 TABLET ORAL EVERY 4 HOURS
Refills: 0 | Status: DISCONTINUED | OUTPATIENT
Start: 2020-12-02 | End: 2020-12-02

## 2020-12-02 RX ORDER — ONDANSETRON 8 MG/1
4 TABLET, FILM COATED ORAL EVERY 6 HOURS
Refills: 0 | Status: DISCONTINUED | OUTPATIENT
Start: 2020-12-02 | End: 2020-12-02

## 2020-12-02 RX ORDER — MORPHINE SULFATE 50 MG/1
4 CAPSULE, EXTENDED RELEASE ORAL EVERY 4 HOURS
Refills: 0 | Status: DISCONTINUED | OUTPATIENT
Start: 2020-12-02 | End: 2020-12-02

## 2020-12-02 RX ORDER — OXYCODONE AND ACETAMINOPHEN 5; 325 MG/1; MG/1
2 TABLET ORAL EVERY 6 HOURS
Refills: 0 | Status: DISCONTINUED | OUTPATIENT
Start: 2020-12-02 | End: 2020-12-02

## 2020-12-02 RX ORDER — ACETAMINOPHEN WITH CODEINE 300MG-30MG
1 TABLET ORAL EVERY 4 HOURS
Refills: 0 | Status: DISCONTINUED | OUTPATIENT
Start: 2020-12-02 | End: 2020-12-02

## 2020-12-02 RX ORDER — DEXAMETHASONE 0.5 MG/5ML
5 ELIXIR ORAL ONCE
Refills: 0 | Status: COMPLETED | OUTPATIENT
Start: 2020-12-02 | End: 2020-12-02

## 2020-12-02 RX ADMIN — MORPHINE SULFATE 4 MILLIGRAM(S): 50 CAPSULE, EXTENDED RELEASE ORAL at 14:54

## 2020-12-02 RX ADMIN — Medication 5 MILLIGRAM(S): at 14:41

## 2020-12-02 RX ADMIN — MORPHINE SULFATE 4 MILLIGRAM(S): 50 CAPSULE, EXTENDED RELEASE ORAL at 15:09

## 2020-12-02 NOTE — BRIEF OPERATIVE NOTE - NSICDXBRIEFPREOP_GEN_ALL_CORE_FT
PRE-OP DIAGNOSIS:  Squamous cell carcinoma of lateral tongue 02-Dec-2020 10:55:29  Deanna Cole  Tongue lesion 02-Dec-2020 10:55:00  Deanna Cole

## 2020-12-02 NOTE — PROGRESS NOTE ADULT - SUBJECTIVE AND OBJECTIVE BOX
POST OP CHECK     Procedure: Right lateral tongue partial glossectomy   Surgeon: Dr. Del Rosario     S: Pt seen and examined at bedside. Reports appropriate post op pain to tongue. Decadron dose scheduled for 2:45pm for post op stridor/edema. Currently denies respiratory issues, no signs of airway compromise. Denies F, N, V, CP, SOB, PEREZ, calf tenderness. Pt is HD stable.    O:  T(C): 36.2 (12-02-20 @ 10:35), Max: 36.2 (12-02-20 @ 10:35)  T(F): 97.2 (12-02-20 @ 10:35), Max: 97.2 (12-02-20 @ 10:35)  HR: 76 (12-02-20 @ 12:00) (70 - 89)  BP: 134/83 (12-02-20 @ 12:00) (130/64 - 156/85)  RR: 14 (12-02-20 @ 12:00) (14 - 18)  SpO2: 99% (12-02-20 @ 12:00) (99% - 100%)  Wt(kg): --      Gen: NAD, resting comfortably in bed, A&O x 3   Neck: No swelling, no abnormalities noted.   C/V: NSR  Pulm: Nonlabored breathing, no respiratory distress  Abd: soft, nd, nt  Extrem: WWP, no calf tenderness or edema, SCDs in place      A/P: 74 Y F with pmh current smoker daily ETOH and prescription xanax use, Celiac's disease, breast cancer (DCIS, L sided lumpectomy 3 yrs ago and on exemestane), anxiety, WAQAR (CPAP with intermittent compliance), HTN, s/p diagnostic cardiac cath 7/15/19 s/p abnormal CTA coronary, hx of persistent tongue lesion, s/p punch bx consistent w/ squamous cell carcinoma, presented for elective surgery, now s/p Right lateral tongue partial glossectomy 12/2.  Pain/nausea control prn  FLD/IVF  Decadron  SCDs  Possible dc later today if stable/no signs of airway compromise POST OP CHECK     Procedure: Right lateral tongue partial glossectomy   Surgeon: Dr. Del Rosario     S: Pt seen and examined at bedside. Reports appropriate post op pain to tongue. Decadron dose scheduled for 2:45pm for post op stridor/edema. Currently denies respiratory issues, no signs of airway compromise. Denies F, N, V, CP, SOB, PEREZ, calf tenderness. Pt is HD stable.    O:  T(C): 36.2 (12-02-20 @ 10:35), Max: 36.2 (12-02-20 @ 10:35)  T(F): 97.2 (12-02-20 @ 10:35), Max: 97.2 (12-02-20 @ 10:35)  HR: 76 (12-02-20 @ 12:00) (70 - 89)  BP: 134/83 (12-02-20 @ 12:00) (130/64 - 156/85)  RR: 14 (12-02-20 @ 12:00) (14 - 18)  SpO2: 99% (12-02-20 @ 12:00) (99% - 100%)  Wt(kg): --      Gen: NAD, resting comfortably in bed, A&O x 3. Tongue sensory/motor intact, surgical site intact, minimal dark blood noted.  Neck: No swelling, no abnormalities noted.   C/V: NSR  Pulm: Nonlabored breathing, no respiratory distress  Abd: soft, nd, nt  Extrem: WWP, no calf tenderness or edema, SCDs in place      A/P: 74 Y F with pmh current smoker daily ETOH and prescription xanax use, Celiac's disease, breast cancer (DCIS, L sided lumpectomy 3 yrs ago and on exemestane), anxiety, WAAQR (CPAP with intermittent compliance), HTN, s/p diagnostic cardiac cath 7/15/19 s/p abnormal CTA coronary, hx of persistent tongue lesion, s/p punch bx consistent w/ squamous cell carcinoma, presented for elective surgery, now s/p Right lateral tongue partial glossectomy 12/2.  Pain/nausea control prn  FLD/IVF  Decadron  SCDs  Possible dc later today if stable/no signs of airway compromise

## 2020-12-02 NOTE — BRIEF OPERATIVE NOTE - NSICDXBRIEFPOSTOP_GEN_ALL_CORE_FT
POST-OP DIAGNOSIS:  Squamous cell carcinoma of lateral tongue 02-Dec-2020 10:56:07  Deanna Cole  Tongue lesion 02-Dec-2020 10:55:44  Deanna Cole

## 2020-12-02 NOTE — BRIEF OPERATIVE NOTE - SPECIMENS
right lateral tongue, Inferior margin, superior margin, posterior margin, anterior margin, deep central margin, deep inferior margin, deep superior margin, deep anterior margin, deep posterior margin

## 2020-12-03 ENCOUNTER — APPOINTMENT (OUTPATIENT)
Dept: OTOLARYNGOLOGY | Facility: CLINIC | Age: 75
End: 2020-12-03

## 2020-12-03 PROBLEM — C15.9 MALIGNANT NEOPLASM OF ESOPHAGUS, UNSPECIFIED: Chronic | Status: ACTIVE | Noted: 2020-12-01

## 2020-12-03 PROBLEM — E78.5 HYPERLIPIDEMIA, UNSPECIFIED: Chronic | Status: ACTIVE | Noted: 2020-12-01

## 2020-12-03 PROBLEM — C02.9 MALIGNANT NEOPLASM OF TONGUE, UNSPECIFIED: Chronic | Status: ACTIVE | Noted: 2020-12-01

## 2020-12-03 PROBLEM — I25.10 ATHEROSCLEROTIC HEART DISEASE OF NATIVE CORONARY ARTERY WITHOUT ANGINA PECTORIS: Chronic | Status: ACTIVE | Noted: 2020-12-01

## 2020-12-03 PROBLEM — F41.9 ANXIETY DISORDER, UNSPECIFIED: Chronic | Status: ACTIVE | Noted: 2020-12-01

## 2020-12-03 PROBLEM — J44.9 CHRONIC OBSTRUCTIVE PULMONARY DISEASE, UNSPECIFIED: Chronic | Status: ACTIVE | Noted: 2020-12-01

## 2020-12-08 ENCOUNTER — APPOINTMENT (OUTPATIENT)
Dept: OTOLARYNGOLOGY | Facility: CLINIC | Age: 75
End: 2020-12-08
Payer: MEDICARE

## 2020-12-08 VITALS
DIASTOLIC BLOOD PRESSURE: 86 MMHG | SYSTOLIC BLOOD PRESSURE: 131 MMHG | RESPIRATION RATE: 17 BRPM | OXYGEN SATURATION: 95 % | TEMPERATURE: 97.9 F | HEART RATE: 78 BPM

## 2020-12-08 PROCEDURE — 99024 POSTOP FOLLOW-UP VISIT: CPT

## 2020-12-08 NOTE — PHYSICAL EXAM
[Normal] : no mass and no adenopathy [de-identified] : The surgical site is healing well with exudate on several silk sutures.  Tongue mobility is normal.  Speech is normal.

## 2020-12-08 NOTE — REASON FOR VISIT
[FreeTextEntry2] : a first postop visit after WLE of right tongue scca.  [FreeTextEntry1] : Kira Stanley MD PCP and referrer

## 2020-12-08 NOTE — HISTORY OF PRESENT ILLNESS
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  She has been evaluated for IBS. 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 She was noted to have a depressed lesion in the right lateral posterior tongue noted by her gastroenterologist, Dr. De Los Santos.  She reports pain when chewing after an upper right molar tooth this past summer with continued irritation. She has no bleeding or dysphagia/dysarthria. She has chronic hoarseness and not able to quit smoking. She may have bitten down on the tongue after the extraction but she is not certain. She has non-suspicious pulmonary nodules and a PET/CT done on 9/17/2020 did not reveal any hypermetabolic activity in the neck. She also has moderate sleep apnea and uses a CPAP machine intermittently.  \par  [FreeTextEntry1] : Noemy returns for her first post op visit after WLE right tongue scca (re-excision after punch biopsy) on 12/2/2020.  She is tolerating a soft diet but has exudates on the tongue and told by her Cardiologist that she has thrush and has been using Nystatin swish and swallow.  There has been no bleeding and one suture fell out.  She has no fever.  Pain is minimal and declining. Surgical pathology is pending.

## 2020-12-09 LAB — SURGICAL PATHOLOGY STUDY: SIGNIFICANT CHANGE UP

## 2020-12-18 ENCOUNTER — APPOINTMENT (OUTPATIENT)
Dept: OTOLARYNGOLOGY | Facility: CLINIC | Age: 75
End: 2020-12-18
Payer: MEDICARE

## 2020-12-18 VITALS
OXYGEN SATURATION: 99 % | DIASTOLIC BLOOD PRESSURE: 73 MMHG | SYSTOLIC BLOOD PRESSURE: 110 MMHG | TEMPERATURE: 98.3 F | HEART RATE: 79 BPM

## 2020-12-18 PROCEDURE — 99024 POSTOP FOLLOW-UP VISIT: CPT

## 2020-12-18 NOTE — CONSULT LETTER
[Dear  ___] : Dear  [unfilled], [Consult Letter:] : I had the pleasure of evaluating your patient, [unfilled]. [Please see my note below.] : Please see my note below. [Consult Closing:] : Thank you very much for allowing me to participate in the care of this patient.  If you have any questions, please do not hesitate to contact me. [Sincerely,] : Sincerely, [FreeTextEntry3] : \par Rahul Del Rosario M.D., FACS, ECNU\par Director Center for Thyroid & Parathyroid Surgery\par [facility]\par Certified in Thyroid/Parathyroid/Neck Ultrasound, ECNU/ AIUM\par \par , Department of Otolaryngology\par [facility] [employer] at [facility]\par

## 2020-12-18 NOTE — PHYSICAL EXAM
[Normal] : no rashes [de-identified] : The surgical site is healing well.  Tongue mobility is normal.  Speech is normal.

## 2020-12-18 NOTE — REASON FOR VISIT
[FreeTextEntry2] : a 2nd postop visit after WLE of right tongue scca.  [FreeTextEntry1] : Kira Stanley MD PCP and referrer

## 2020-12-18 NOTE — HISTORY OF PRESENT ILLNESS
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  She has been evaluated for IBS. 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 She was noted to have a depressed lesion in the right lateral posterior tongue noted by her gastroenterologist, Dr. De Los Santos.  She reports pain when chewing after an upper right molar tooth this past summer with continued irritation. She has no bleeding or dysphagia/dysarthria. She has chronic hoarseness and not able to quit smoking. She may have bitten down on the tongue after the extraction but she is not certain. She has non-suspicious pulmonary nodules and a PET/CT done on 9/17/2020 did not reveal any hypermetabolic activity in the neck. She also has moderate sleep apnea and uses a CPAP machine intermittently.  \par  [FreeTextEntry1] : Noemy returns for her 2nd post op visit after WLE right tongue scca (re-excision after punch biopsy) on 12/2/2020.  She is tolerating a soft diet but had exudates on the tongue and told by her Cardiologist that she has thrush and has been using Nystatin swish and swallow.  There has been no bleeding and one suture fell out.  She has no fever.  Pain is minimal and declining. Surgical pathology was negative for residual invasive carcinoma or high grade dysplasia.

## 2020-12-24 ENCOUNTER — TRANSCRIPTION ENCOUNTER (OUTPATIENT)
Age: 75
End: 2020-12-24

## 2021-04-19 ENCOUNTER — APPOINTMENT (OUTPATIENT)
Dept: OTOLARYNGOLOGY | Facility: CLINIC | Age: 76
End: 2021-04-19

## 2021-05-13 ENCOUNTER — APPOINTMENT (OUTPATIENT)
Dept: OTOLARYNGOLOGY | Facility: CLINIC | Age: 76
End: 2021-05-13
Payer: MEDICARE

## 2021-05-13 VITALS
TEMPERATURE: 98.1 F | WEIGHT: 114 LBS | BODY MASS INDEX: 19.95 KG/M2 | HEART RATE: 92 BPM | DIASTOLIC BLOOD PRESSURE: 77 MMHG | RESPIRATION RATE: 14 BRPM | OXYGEN SATURATION: 96 % | SYSTOLIC BLOOD PRESSURE: 124 MMHG | HEIGHT: 63.5 IN

## 2021-05-13 DIAGNOSIS — F17.200 NICOTINE DEPENDENCE, UNSPECIFIED, UNCOMPLICATED: ICD-10-CM

## 2021-05-13 PROCEDURE — 31575 DIAGNOSTIC LARYNGOSCOPY: CPT

## 2021-05-13 PROCEDURE — 99214 OFFICE O/P EST MOD 30 MIN: CPT | Mod: 25

## 2021-05-13 NOTE — REASON FOR VISIT
[FreeTextEntry2] : a followup visit after WLE of right tongue scca in December 2020. [FreeTextEntry1] : Kira Stanley MD PCP and referrer, New Heme/Onc is Maru Thornton MD [phone]

## 2021-05-13 NOTE — HISTORY OF PRESENT ILLNESS
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  She has been evaluated for IBS. 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 She was noted to have a depressed lesion in the right lateral posterior tongue noted by her gastroenterologist, Dr. De Los Santos.  She reports pain when chewing after an upper right molar tooth this past summer with continued irritation. She has no bleeding or dysphagia/dysarthria. She has chronic hoarseness and not able to quit smoking. She may have bitten down on the tongue after the extraction but she is not certain. She has non-suspicious pulmonary nodules and a PET/CT done on 9/17/2020 did not reveal any hypermetabolic activity in the neck. She also has moderate sleep apnea and uses a CPAP machine intermittently.  \par  [FreeTextEntry1] : Noemy returns for a follow up visit after WLE right tongue scca (re-excision after punch biopsy) on 12/2/2020. Surgical pathology was negative for residual invasive carcinoma or high grade dysplasia. She missed her last f/u appointment had to rescheduled due to husbands illness.  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 This mutation may explain her numerous malignancies including tongue squamous cell cancer, intraductal breast cancer, carcinoma in situ of the esophagus, a stable lung nodule.  Her tongue has healed well and she denies ulcerations, bleeding or new mass. She had FDG-PET/CT at [facility] on 05/06/2021.  She reports that it was negative for malignancy.  She denies fever, body aches, cough, cyanosis, chest burning, anosmia or recent known COVID exposures.  All family members at home are well. She has been vaccinated (Pfizer).

## 2021-05-13 NOTE — CONSULT LETTER
[Dear  ___] : Dear  [unfilled], [Consult Letter:] : I had the pleasure of evaluating your patient, [unfilled]. [Please see my note below.] : Please see my note below. [Consult Closing:] : Thank you very much for allowing me to participate in the care of this patient.  If you have any questions, please do not hesitate to contact me. [Sincerely,] : Sincerely, [DrRosalie  ___] : Dr. TILLMAN [FreeTextEntry3] : \par Rahul Del Rosario M.D., FACS, ECNU\par Director [facility] Parathyroid [facility]\par [facility]\par Certified in Thyroid/Parathyroid/Neck Ultrasound, ECNU/ AIUM\par \par , Department of Otolaryngology\par [facility] [employer] at [facility]\par   [DrRosalie ___] : Dr. TILLMAN

## 2021-05-13 NOTE — PROCEDURE
[Image(s) Captured] : image(s) captured and filed [Unable to Cooperate with Mirror] : patient unable to cooperate with mirror [Gag Reflex] : gag reflex preventing mirror examination [Hoarseness] : hoarseness not clearly evaluated by indirect laryngoscopy [Complicated Symptoms] : complicated symptoms requiring more thorough examination than provided by mirror [Topical Lidocaine] : topical lidocaine [Oxymetazoline HCl] : oxymetazoline HCl [Flexible Endoscope] : examined with the flexible endoscope [Serial Number: ___] : Serial Number: [unfilled] [de-identified] : The nasal septum is minimally deviated to the right anteriorly. There are no masses or polyps and the nasal mucosa and secretions are normal. The choanae and posterior nasopharynx are normal without masses or drainage. The Eustachian tube orifices appear patent. The pharynx, including the posterior and lateral pharyngeal walls, the vallecula and base of tongue are normal without ulcerations, lesions or masses. The hypopharynx including the pyriform sinuses open well without pooling of secretions, mucosal lesions or masses. The supraglottic larynx including the epiglottis, petiole, arytenoids, glossoepiglottic, aryepiglottic and pharyngoepiglottic folds are normal without mucosal lesions, ulcerations or masses. The glottis reveals normal false vocal folds. The true vocal folds are glistening white, tense and of equal length, without paralysis, having symmetric mobility on adduction and abduction. There are floppy, sessile edematous polyps without erythroplasia or leukoplakia. The posterior cricoid area has healthy pink mucosa in the interarytenoid area and esophageal inlet. There is moderate thickening/edema of the interarytenoid mucosa suggestive of posterior laryngitis from laryngopharyngeal acid reflux disease. The trachea is clear without narrowing in the immediate subglottic region, without deviation or lesions. \par  [de-identified] : tongue cancer, esophageal cancer,

## 2021-06-26 ENCOUNTER — EMERGENCY (EMERGENCY)
Facility: HOSPITAL | Age: 76
LOS: 1 days | Discharge: HOME | End: 2021-06-26
Attending: EMERGENCY MEDICINE | Admitting: EMERGENCY MEDICINE
Payer: MEDICARE

## 2021-06-26 VITALS
SYSTOLIC BLOOD PRESSURE: 133 MMHG | DIASTOLIC BLOOD PRESSURE: 85 MMHG | TEMPERATURE: 98 F | HEART RATE: 85 BPM | OXYGEN SATURATION: 96 % | RESPIRATION RATE: 18 BRPM

## 2021-06-26 VITALS
WEIGHT: 115.08 LBS | TEMPERATURE: 98 F | DIASTOLIC BLOOD PRESSURE: 89 MMHG | OXYGEN SATURATION: 97 % | SYSTOLIC BLOOD PRESSURE: 144 MMHG | RESPIRATION RATE: 18 BRPM | HEART RATE: 99 BPM | HEIGHT: 64 IN

## 2021-06-26 DIAGNOSIS — Z91.018 ALLERGY TO OTHER FOODS: ICD-10-CM

## 2021-06-26 DIAGNOSIS — J44.9 CHRONIC OBSTRUCTIVE PULMONARY DISEASE, UNSPECIFIED: ICD-10-CM

## 2021-06-26 DIAGNOSIS — Z90.89 ACQUIRED ABSENCE OF OTHER ORGANS: Chronic | ICD-10-CM

## 2021-06-26 DIAGNOSIS — Z98.890 OTHER SPECIFIED POSTPROCEDURAL STATES: Chronic | ICD-10-CM

## 2021-06-26 DIAGNOSIS — Z90.711 ACQUIRED ABSENCE OF UTERUS WITH REMAINING CERVICAL STUMP: Chronic | ICD-10-CM

## 2021-06-26 DIAGNOSIS — I10 ESSENTIAL (PRIMARY) HYPERTENSION: ICD-10-CM

## 2021-06-26 DIAGNOSIS — E87.5 HYPERKALEMIA: ICD-10-CM

## 2021-06-26 DIAGNOSIS — Z91.013 ALLERGY TO SEAFOOD: ICD-10-CM

## 2021-06-26 DIAGNOSIS — I25.10 ATHEROSCLEROTIC HEART DISEASE OF NATIVE CORONARY ARTERY WITHOUT ANGINA PECTORIS: ICD-10-CM

## 2021-06-26 DIAGNOSIS — Z85.3 PERSONAL HISTORY OF MALIGNANT NEOPLASM OF BREAST: ICD-10-CM

## 2021-06-26 DIAGNOSIS — Z91.012 ALLERGY TO EGGS: ICD-10-CM

## 2021-06-26 DIAGNOSIS — Z85.01 PERSONAL HISTORY OF MALIGNANT NEOPLASM OF ESOPHAGUS: ICD-10-CM

## 2021-06-26 LAB
ALBUMIN SERPL ELPH-MCNC: 3.9 G/DL — SIGNIFICANT CHANGE UP (ref 3.3–5)
ALBUMIN SERPL ELPH-MCNC: 4.5 G/DL — SIGNIFICANT CHANGE UP (ref 3.3–5)
ALP SERPL-CCNC: 74 U/L — SIGNIFICANT CHANGE UP (ref 40–120)
ALP SERPL-CCNC: SIGNIFICANT CHANGE UP U/L (ref 40–120)
ALT FLD-CCNC: 5 U/L — LOW (ref 10–45)
ALT FLD-CCNC: SIGNIFICANT CHANGE UP U/L (ref 10–45)
ANION GAP SERPL CALC-SCNC: 15 MMOL/L — SIGNIFICANT CHANGE UP (ref 5–17)
ANION GAP SERPL CALC-SCNC: 16 MMOL/L — SIGNIFICANT CHANGE UP (ref 5–17)
AST SERPL-CCNC: 22 U/L — SIGNIFICANT CHANGE UP (ref 10–40)
AST SERPL-CCNC: SIGNIFICANT CHANGE UP U/L (ref 10–40)
BASE EXCESS BLDV CALC-SCNC: -2.2 MMOL/L — LOW (ref -2–3)
BASOPHILS # BLD AUTO: 0.22 K/UL — HIGH (ref 0–0.2)
BASOPHILS NFR BLD AUTO: 1.6 % — SIGNIFICANT CHANGE UP (ref 0–2)
BILIRUB SERPL-MCNC: 0.4 MG/DL — SIGNIFICANT CHANGE UP (ref 0.2–1.2)
BILIRUB SERPL-MCNC: 0.5 MG/DL — SIGNIFICANT CHANGE UP (ref 0.2–1.2)
BUN SERPL-MCNC: 9 MG/DL — SIGNIFICANT CHANGE UP (ref 7–23)
BUN SERPL-MCNC: 9 MG/DL — SIGNIFICANT CHANGE UP (ref 7–23)
CA-I SERPL-SCNC: 1.21 MMOL/L — SIGNIFICANT CHANGE UP (ref 1.15–1.33)
CALCIUM SERPL-MCNC: 9.2 MG/DL — SIGNIFICANT CHANGE UP (ref 8.4–10.5)
CALCIUM SERPL-MCNC: 9.7 MG/DL — SIGNIFICANT CHANGE UP (ref 8.4–10.5)
CHLORIDE SERPL-SCNC: 101 MMOL/L — SIGNIFICANT CHANGE UP (ref 96–108)
CHLORIDE SERPL-SCNC: 105 MMOL/L — SIGNIFICANT CHANGE UP (ref 96–108)
CO2 BLDV-SCNC: 25.6 MMOL/L — SIGNIFICANT CHANGE UP (ref 22–26)
CO2 SERPL-SCNC: 21 MMOL/L — LOW (ref 22–31)
CO2 SERPL-SCNC: 24 MMOL/L — SIGNIFICANT CHANGE UP (ref 22–31)
CREAT SERPL-MCNC: 0.64 MG/DL — SIGNIFICANT CHANGE UP (ref 0.5–1.3)
CREAT SERPL-MCNC: 0.71 MG/DL — SIGNIFICANT CHANGE UP (ref 0.5–1.3)
EOSINOPHIL # BLD AUTO: 0.16 K/UL — SIGNIFICANT CHANGE UP (ref 0–0.5)
EOSINOPHIL NFR BLD AUTO: 1.1 % — SIGNIFICANT CHANGE UP (ref 0–6)
GAS PNL BLDV: 136 MMOL/L — SIGNIFICANT CHANGE UP (ref 136–145)
GAS PNL BLDV: SIGNIFICANT CHANGE UP
GLUCOSE SERPL-MCNC: 76 MG/DL — SIGNIFICANT CHANGE UP (ref 70–99)
GLUCOSE SERPL-MCNC: 80 MG/DL — SIGNIFICANT CHANGE UP (ref 70–99)
HCO3 BLDV-SCNC: 24 MMOL/L — SIGNIFICANT CHANGE UP (ref 22–29)
HCT VFR BLD CALC: 37.3 % — SIGNIFICANT CHANGE UP (ref 34.5–45)
HGB BLD-MCNC: 12.3 G/DL — SIGNIFICANT CHANGE UP (ref 11.5–15.5)
IMM GRANULOCYTES NFR BLD AUTO: 0.7 % — SIGNIFICANT CHANGE UP (ref 0–1.5)
LYMPHOCYTES # BLD AUTO: 22.3 % — SIGNIFICANT CHANGE UP (ref 13–44)
LYMPHOCYTES # BLD AUTO: 3.13 K/UL — SIGNIFICANT CHANGE UP (ref 1–3.3)
MAGNESIUM SERPL-MCNC: 1.5 MG/DL — LOW (ref 1.6–2.6)
MCHC RBC-ENTMCNC: 31.8 PG — SIGNIFICANT CHANGE UP (ref 27–34)
MCHC RBC-ENTMCNC: 33 GM/DL — SIGNIFICANT CHANGE UP (ref 32–36)
MCV RBC AUTO: 96.4 FL — SIGNIFICANT CHANGE UP (ref 80–100)
MONOCYTES # BLD AUTO: 1.21 K/UL — HIGH (ref 0–0.9)
MONOCYTES NFR BLD AUTO: 8.6 % — SIGNIFICANT CHANGE UP (ref 2–14)
NEUTROPHILS # BLD AUTO: 9.21 K/UL — HIGH (ref 1.8–7.4)
NEUTROPHILS NFR BLD AUTO: 65.7 % — SIGNIFICANT CHANGE UP (ref 43–77)
NRBC # BLD: 0 /100 WBCS — SIGNIFICANT CHANGE UP (ref 0–0)
PCO2 BLDV: 47 MMHG — HIGH (ref 39–42)
PH BLDV: 7.32 — SIGNIFICANT CHANGE UP (ref 7.32–7.43)
PLATELET # BLD AUTO: 640 K/UL — HIGH (ref 150–400)
PO2 BLDV: 28 MMHG — SIGNIFICANT CHANGE UP
POTASSIUM BLDV-SCNC: 4.5 MMOL/L — SIGNIFICANT CHANGE UP (ref 3.5–5.1)
POTASSIUM SERPL-MCNC: 4.5 MMOL/L — SIGNIFICANT CHANGE UP (ref 3.5–5.3)
POTASSIUM SERPL-MCNC: SIGNIFICANT CHANGE UP MMOL/L (ref 3.5–5.3)
POTASSIUM SERPL-SCNC: 4.5 MMOL/L — SIGNIFICANT CHANGE UP (ref 3.5–5.3)
POTASSIUM SERPL-SCNC: SIGNIFICANT CHANGE UP MMOL/L (ref 3.5–5.3)
PROT SERPL-MCNC: 6.6 G/DL — SIGNIFICANT CHANGE UP (ref 6–8.3)
PROT SERPL-MCNC: 8.2 G/DL — SIGNIFICANT CHANGE UP (ref 6–8.3)
RBC # BLD: 3.87 M/UL — SIGNIFICANT CHANGE UP (ref 3.8–5.2)
RBC # FLD: 19.5 % — HIGH (ref 10.3–14.5)
SAO2 % BLDV: 38.2 % — SIGNIFICANT CHANGE UP
SODIUM SERPL-SCNC: 140 MMOL/L — SIGNIFICANT CHANGE UP (ref 135–145)
SODIUM SERPL-SCNC: 142 MMOL/L — SIGNIFICANT CHANGE UP (ref 135–145)
WBC # BLD: 14.03 K/UL — HIGH (ref 3.8–10.5)
WBC # FLD AUTO: 14.03 K/UL — HIGH (ref 3.8–10.5)

## 2021-06-26 PROCEDURE — 82330 ASSAY OF CALCIUM: CPT

## 2021-06-26 PROCEDURE — 99283 EMERGENCY DEPT VISIT LOW MDM: CPT

## 2021-06-26 PROCEDURE — 82803 BLOOD GASES ANY COMBINATION: CPT

## 2021-06-26 PROCEDURE — 84295 ASSAY OF SERUM SODIUM: CPT

## 2021-06-26 PROCEDURE — 36415 COLL VENOUS BLD VENIPUNCTURE: CPT

## 2021-06-26 PROCEDURE — 83735 ASSAY OF MAGNESIUM: CPT

## 2021-06-26 PROCEDURE — 84132 ASSAY OF SERUM POTASSIUM: CPT

## 2021-06-26 PROCEDURE — 80053 COMPREHEN METABOLIC PANEL: CPT

## 2021-06-26 PROCEDURE — 85025 COMPLETE CBC W/AUTO DIFF WBC: CPT

## 2021-06-26 PROCEDURE — 99284 EMERGENCY DEPT VISIT MOD MDM: CPT

## 2021-06-26 RX ORDER — MAGNESIUM OXIDE 400 MG ORAL TABLET 241.3 MG
400 TABLET ORAL ONCE
Refills: 0 | Status: COMPLETED | OUTPATIENT
Start: 2021-06-26 | End: 2021-06-26

## 2021-06-26 RX ADMIN — MAGNESIUM OXIDE 400 MG ORAL TABLET 400 MILLIGRAM(S): 241.3 TABLET ORAL at 18:50

## 2021-06-26 NOTE — ED PROVIDER NOTE - OBJECTIVE STATEMENT
77 yo f with pmh of anne 2 mutation, breast ca s/p lumpectomy 2016, esophageal ca s/p surgery 2020, HTN, non-obstructive CAD, COPD sent by her oncologist for potassium of 6.5 yesterday. pt states 3 days ago it was 6.1. Reports feeling well. Denies dizziness, cp, sob, palpitations, abd pain, n/v.

## 2021-06-26 NOTE — ED PROVIDER NOTE - CARE PROVIDER_API CALL
Kira Stanley)  Internal Medicine; Medical Oncology  [address] [id number]  Phone: [phone]  Fax: [phone]  Follow Up Time:

## 2021-06-26 NOTE — ED PROVIDER NOTE - PROGRESS NOTE DETAILS
vbg - K 4.5, cmp hemolyzed. Likely previous lab error given normal EKG, Cr mg 1.5 will give PO magnesium. Repeat cmp potassium normal

## 2021-06-26 NOTE — ED PROVIDER NOTE - CLINICAL SUMMARY MEDICAL DECISION MAKING FREE TEXT BOX
75 yo f with pmh of anne 2 mutation, breast ca s/p lumpectomy 2016, esophageal ca s/p surgery 2020, HTN, non-obstructive CAD, COPD sent by her oncologist for potassium of 6.5 yesterday. pt states 3 days ago it was 6.1. Reports feeling well. Denies dizziness, cp, sob, palpitations, abd pain, n/v. VSS. EKG NSR no T waves changes, no ischemia. PE unremarkable. Will repeat labs, if high will treat.

## 2021-06-26 NOTE — ED ADULT TRIAGE NOTE - CHIEF COMPLAINT QUOTE
pt arriving to ED for c/c elevated potassium of 6.5. Referred by PCP for evaluation. hx Breast CA, esophageal CA, CAD, COPD, HTN. pt denies palpitations, chest pain, sob, n/v/d fever.

## 2021-06-26 NOTE — ED PROVIDER NOTE - ATTENDING CONTRIBUTION TO CARE
76 F ho brca s/p lumpectomy, esophageal ca cad  had routine blood work- K+ elevated on labs  vss  ekg nml  s1s2 lungs cta bl  abd soft nt nd +bs  ext no c/c/e  IMP- High K on labs  doubt accuracy given vbg K+ 4.5 , creatinine and ekg nml

## 2021-06-26 NOTE — ED ADULT NURSE NOTE - OBJECTIVE STATEMENT
PT arriving to ED for c/c elevated potassium of 6.5. Referred by PCP for evaluation. hx Breast CA, esophageal CA, CAD, COPD, HTN. pt denies palpitations, chest pain, sob, n/v/d fever.  Pt sent in by PMD. PT denies any other medical complaints at this time. Alert and oriented x3. Ambulatory with even gait.

## 2021-06-26 NOTE — ED ADULT NURSE NOTE - NSIMPLEMENTINTERV_GEN_ALL_ED
Implemented All Universal Safety Interventions:  [place] to call system. Call bell, personal items and telephone within reach. Instruct patient to call for assistance. Room bathroom lighting operational. Non-slip footwear when patient is off stretcher. Physically safe environment: no spills, clutter or unnecessary equipment. Stretcher in lowest position, wheels locked, appropriate side rails in place.

## 2021-06-26 NOTE — ED PROVIDER NOTE - PATIENT PORTAL LINK FT
You can access the FollowMyHealth Patient Portal offered by [facility] by registering at the following website: [email or web address]. By joining [facility]’s FollowMyHealth portal, you will also be able to view your health information using other applications (apps) compatible with our system.

## 2021-06-26 NOTE — ED PROVIDER NOTE - NSFOLLOWUPINSTRUCTIONS_ED_ALL_ED_FT
Please follow up with your primary care doctor in 24-48 hours. Return to ED for any worsening or emergent symptoms. Your potassium was within normal limits. Please follow up with your primary care doctor in 24-48 hours. Return to ED for any worsening or emergent symptoms.

## 2021-07-01 ENCOUNTER — RX RENEWAL (OUTPATIENT)
Age: 76
End: 2021-07-01

## 2021-07-22 ENCOUNTER — APPOINTMENT (OUTPATIENT)
Dept: HEART AND VASCULAR | Facility: CLINIC | Age: 76
End: 2021-07-22
Payer: MEDICARE

## 2021-07-22 VITALS
DIASTOLIC BLOOD PRESSURE: 76 MMHG | RESPIRATION RATE: 14 BRPM | OXYGEN SATURATION: 96 % | SYSTOLIC BLOOD PRESSURE: 120 MMHG | WEIGHT: 112.5 LBS | HEIGHT: 63.5 IN | BODY MASS INDEX: 19.69 KG/M2 | HEART RATE: 90 BPM | TEMPERATURE: 97 F

## 2021-07-22 DIAGNOSIS — D05.10 INTRADUCTAL CARCINOMA IN SITU OF UNSPECIFIED BREAST: ICD-10-CM

## 2021-07-22 DIAGNOSIS — J44.9 CHRONIC OBSTRUCTIVE PULMONARY DISEASE, UNSPECIFIED: ICD-10-CM

## 2021-07-22 PROCEDURE — 99215 OFFICE O/P EST HI 40 MIN: CPT

## 2021-07-22 PROCEDURE — 93000 ELECTROCARDIOGRAM COMPLETE: CPT

## 2021-07-23 PROBLEM — J44.9 COPD (CHRONIC OBSTRUCTIVE PULMONARY DISEASE): Status: ACTIVE | Noted: 2021-07-23

## 2021-07-23 PROBLEM — D05.10 DCIS (DUCTAL CARCINOMA IN SITU): Status: ACTIVE | Noted: 2020-02-27

## 2021-07-23 PROBLEM — J44.9 CHRONIC OBSTRUCTIVE PULMONARY DISEASE: Status: ACTIVE | Noted: 2019-06-02

## 2021-07-23 RX ORDER — MAGNESIUM OXIDE 400 MG
400 CAPSULE ORAL
Qty: 90 | Refills: 1 | Status: ACTIVE | COMMUNITY
Start: 2021-07-23 | End: 1900-01-01

## 2021-07-23 NOTE — PHYSICAL EXAM
[Well Developed] : well developed [Well Nourished] : well nourished [No Acute Distress] : no acute distress [Normal Conjunctiva] : normal conjunctiva [No Xanthelasma] : no xanthelasma [Normal Venous Pressure] : normal venous pressure [No Carotid Bruit] : no carotid bruit [Normal S1, S2] : normal S1, S2 [No Murmur] : no murmur [No Rub] : no rub [No Gallop] : no gallop [Clear Lung Fields] : clear lung fields [Good Air Entry] : good air entry [No Respiratory Distress] : no respiratory distress  [Soft] : abdomen soft [Non Tender] : non-tender [No Masses/organomegaly] : no masses/organomegaly [Normal Bowel Sounds] : normal bowel sounds [Normal Gait] : normal gait [Gait - Sufficient for Exercise Testing] : gait - sufficient for exercise testing [No Cyanosis] : no cyanosis [No Clubbing] : no clubbing [No Varicosities] : no varicosities [Edema ___] : edema [unfilled] [No Rash] : no rash [No Skin Lesions] : no skin lesions [Moves all extremities] : moves all extremities [No Focal Deficits] : no focal deficits [Alert and Oriented] : alert and oriented [Normal memory] : normal memory [de-identified] : anicteric  [de-identified] : chronic hoarse voice

## 2021-07-23 NOTE — REASON FOR VISIT
Detail Level: Zone [Symptom and Test Evaluation] : symptom and test evaluation [Hyperlipidemia] : hyperlipidemia [FreeTextEntry1] : 76  year old chronic heavy smoker  with hx of  DCIS of left breast  and  CAD  had developed transaminitis on statins. She has since stopped statins and LFTs normalized. \par \par S/P resection of right sided tongue squamous cell carcinoma in December 2020 \par \par She has  more recent diagnosis of  essential  hemorrhagic thrombocythemia with JAK2 V617F mutation and has been on Hydrea  but developed intolerable diarrhea

## 2021-07-23 NOTE — REVIEW OF SYSTEMS
[Weight Loss (___ Lbs)] : [unfilled] ~Ulb weight loss [Lower Ext Edema] : lower extremity edema [Diarrhea] : diarrhea [Anxiety] : anxiety [Under Stress] : under stress [Negative] : Heme/Lymph

## 2021-07-23 NOTE — HISTORY OF PRESENT ILLNESS
[FreeTextEntry1] : Today  EKG shows NSR   81   bpm with no ischemic changes  or ectopy. \par \par \par There is family hx of premature CAD.  Had negative nuclear  stress test  August 2017 and  nonobstructive CAD by cath last July. \par \par \par Denies hemoptysis, fevers, syncope, orthopnea, PND, wheezing.  No hematuria, rectal bleeding, strokes or TIAs. \par \par Continues to smoke  and drink alcohol \par \par EGD follow up with Dr. reyes  was negative for  return of esophageal cancer \par \par \par She is in the process of dental work \par \par \par s/p Pfizer covid vaccine series  which she completed in February \par \par to meet with her breast surgeon regarding need for Letrozole  since she has been on it since 2016 \par \par Denies angina, bleeding, fevers, hemoptysis \par \par \par Smokers  polyps of the  throat  are cause of her chronic hoarse voice  and PET/CT was negative in May 2021

## 2021-07-23 NOTE — DISCUSSION/SUMMARY
[With Me] : with me [___ Month(s)] : in [unfilled] month(s) [FreeTextEntry1] : reviewed outside labs and plans to potentially switch  Hydrea to Jakafi\par \par  current platelet counts 499   down from 702 K\par \par take magnesium oxide 400mg daily \par \par \par \par

## 2021-07-23 NOTE — ASSESSMENT
[FreeTextEntry1] : stable hemodynamics\par \par SARIAH 2 essential , hemorrhagic thrombocytosis \par \par Not tolerating Hydrea \par \par continues to smoke despite  tongue and esophageal cancer  history \par \par hypomagnesemia

## 2021-08-12 ENCOUNTER — APPOINTMENT (OUTPATIENT)
Dept: OTOLARYNGOLOGY | Facility: CLINIC | Age: 76
End: 2021-08-12
Payer: MEDICARE

## 2021-08-12 VITALS
OXYGEN SATURATION: 98 % | TEMPERATURE: 97.8 F | DIASTOLIC BLOOD PRESSURE: 82 MMHG | HEIGHT: 63.5 IN | SYSTOLIC BLOOD PRESSURE: 128 MMHG | HEART RATE: 76 BPM | WEIGHT: 115 LBS | BODY MASS INDEX: 20.12 KG/M2

## 2021-08-12 DIAGNOSIS — J02.9 ACUTE PHARYNGITIS, UNSPECIFIED: ICD-10-CM

## 2021-08-12 DIAGNOSIS — R49.9 UNSPECIFIED VOICE AND RESONANCE DISORDER: ICD-10-CM

## 2021-08-12 DIAGNOSIS — B37.0 CANDIDAL STOMATITIS: ICD-10-CM

## 2021-08-12 DIAGNOSIS — J38.1 POLYP OF VOCAL CORD AND LARYNX: ICD-10-CM

## 2021-08-12 DIAGNOSIS — C02.9 MALIGNANT NEOPLASM OF TONGUE, UNSPECIFIED: ICD-10-CM

## 2021-08-12 PROCEDURE — 99215 OFFICE O/P EST HI 40 MIN: CPT | Mod: 25

## 2021-08-12 PROCEDURE — 31575 DIAGNOSTIC LARYNGOSCOPY: CPT

## 2021-08-12 NOTE — HISTORY OF PRESENT ILLNESS
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  She has been evaluated for IBS. 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 She was noted to have a depressed lesion in the right lateral posterior tongue noted by her gastroenterologist, Dr. De Los Santos.  She reports pain when chewing after an upper right molar tooth this past summer with continued irritation. She has no bleeding or dysphagia/dysarthria. She has chronic hoarseness and not able to quit smoking. She may have bitten down on the tongue after the extraction but she is not certain. She has non-suspicious pulmonary nodules and a PET/CT done on 9/17/2020 did not reveal any hypermetabolic activity in the neck. She also has moderate sleep apnea and uses a CPAP machine intermittently.  \par  [FreeTextEntry1] : Noemy returns for a follow up visit after WLE right tongue scca (re-excision after punch biopsy) on 12/2/2020. Surgical pathology was negative for residual invasive carcinoma or high grade dysplasia.  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 This mutation may explain her numerous malignancies including tongue squamous cell cancer, intraductal breast cancer, carcinoma in situ of the esophagus, a stable lung nodule.  Her tongue has healed well and she denies ulcerations, bleeding or new mass. She had FDG-PET/CT at [facility] on 05/06/2021.  She reports that it was negative for malignancy.  She had a follow-up esophagoscopy which was JULIAN.  However, she has been intolerant with the Hydrea treatment because of diarrhea and now needs to see another hematologist at [facility] for alternate treatment.  Since her last visit she has been back to her dentist and the sharp tooth that was rubbing against her tongue has since been polished down and smooth.  The tongue biopsy site has healed completely.  She denies fever, body aches, cough, cyanosis, chest burning, anosmia or recent known COVID exposures.  All family members at home are well. She has been vaccinated (Pfizer). She is still smoking despite all admonishing.

## 2021-08-12 NOTE — PROCEDURE
[Image(s) Captured] : image(s) captured and filed [Unable to Cooperate with Mirror] : patient unable to cooperate with mirror [Gag Reflex] : gag reflex preventing mirror examination [Hoarseness] : hoarseness not clearly evaluated by indirect laryngoscopy [Complicated Symptoms] : complicated symptoms requiring more thorough examination than provided by mirror [Topical Lidocaine] : topical lidocaine [Oxymetazoline HCl] : oxymetazoline HCl [Flexible Endoscope] : examined with the flexible endoscope [Serial Number: ___] : Serial Number: [unfilled] [de-identified] : The nasal septum is minimally deviated to the right anteriorly. There are no masses or polyps and the nasal mucosa and secretions are normal. The choanae and posterior nasopharynx are normal without masses or drainage. The Eustachian tube orifices appear patent. There is a rough 1 cm erythematous patch of  mucosa in the posterior pharyngeal wall with an exudate, the vallecula and base of tongue are normal without ulcerations, lesions or masses. The hypopharynx including the pyriform sinuses open well without pooling of secretions, mucosal lesions or masses. The supraglottic larynx including the epiglottis, petiole, arytenoids, glossoepiglottic, aryepiglottic and pharyngoepiglottic folds are normal without mucosal lesions, ulcerations or masses. The glottis reveals normal false vocal folds. The true vocal folds are glistening white, tense and of equal length, without paralysis, having symmetric mobility on adduction and abduction. There are stable floppy, sessile edematous polyps without erythroplasia or leukoplakia. The posterior cricoid area has healthy pink mucosa in the interarytenoid area and esophageal inlet. There is moderate thickening/edema of the interarytenoid mucosa suggestive of posterior laryngitis from laryngopharyngeal acid reflux disease. The trachea is clear without narrowing in the immediate subglottic region, without deviation or lesions. \par  [de-identified] : tongue cancer, esophageal cancer

## 2021-08-12 NOTE — CONSULT LETTER
[Dear  ___] : Dear  [unfilled], [Consult Letter:] : I had the pleasure of evaluating your patient, [unfilled]. [Please see my note below.] : Please see my note below. [Consult Closing:] : Thank you very much for allowing me to participate in the care of this patient.  If you have any questions, please do not hesitate to contact me. [Sincerely,] : Sincerely, [DrRosalie  ___] : Dr. TILLMAN [DrRosalie ___] : Dr. TILLMAN [FreeTextEntry3] : \par Rahul Del Rosario M.D., FACS, ECNU\par Director Center for Thyroid & Parathyroid Surgery\par [facility]\par Certified in Thyroid/Parathyroid/Neck Ultrasound, ECNU/ AIUM\par \par , Department of Otolaryngology\par [facility] [employer] at [facility] [place]\par

## 2021-08-12 NOTE — REASON FOR VISIT
[FreeTextEntry2] : a followup visit after WLE of right tongue scca in December 2020. [FreeTextEntry1] : Kira Stanley MD PCP and referrer, New Heme/Onc is Maru Thornton MD [phone]

## 2021-08-19 ENCOUNTER — APPOINTMENT (OUTPATIENT)
Dept: OTOLARYNGOLOGY | Facility: CLINIC | Age: 76
End: 2021-08-19
Payer: MEDICARE

## 2021-08-19 VITALS
TEMPERATURE: 98.1 F | BODY MASS INDEX: 19.6 KG/M2 | HEART RATE: 83 BPM | DIASTOLIC BLOOD PRESSURE: 81 MMHG | WEIGHT: 112 LBS | OXYGEN SATURATION: 97 % | HEIGHT: 63.5 IN | SYSTOLIC BLOOD PRESSURE: 135 MMHG

## 2021-08-19 DIAGNOSIS — J39.2 OTHER DISEASES OF PHARYNX: ICD-10-CM

## 2021-08-19 PROCEDURE — 99213 OFFICE O/P EST LOW 20 MIN: CPT

## 2021-08-27 ENCOUNTER — LABORATORY RESULT (OUTPATIENT)
Age: 76
End: 2021-08-27

## 2021-08-27 ENCOUNTER — APPOINTMENT (OUTPATIENT)
Dept: HEART AND VASCULAR | Facility: CLINIC | Age: 76
End: 2021-08-27
Payer: MEDICARE

## 2021-08-27 ENCOUNTER — APPOINTMENT (OUTPATIENT)
Dept: HEART AND VASCULAR | Facility: CLINIC | Age: 76
End: 2021-08-27

## 2021-08-27 VITALS
DIASTOLIC BLOOD PRESSURE: 76 MMHG | HEART RATE: 83 BPM | HEIGHT: 63.5 IN | BODY MASS INDEX: 19.95 KG/M2 | OXYGEN SATURATION: 97 % | TEMPERATURE: 97.9 F | WEIGHT: 114 LBS | SYSTOLIC BLOOD PRESSURE: 126 MMHG

## 2021-08-27 DIAGNOSIS — Z01.818 ENCOUNTER FOR OTHER PREPROCEDURAL EXAMINATION: ICD-10-CM

## 2021-08-27 PROCEDURE — 36415 COLL VENOUS BLD VENIPUNCTURE: CPT

## 2021-08-27 PROCEDURE — 93000 ELECTROCARDIOGRAM COMPLETE: CPT | Mod: NC

## 2021-08-27 PROCEDURE — 99214 OFFICE O/P EST MOD 30 MIN: CPT | Mod: 57

## 2021-08-28 LAB
ALBUMIN SERPL ELPH-MCNC: 4.3 G/DL
ALP BLD-CCNC: 72 U/L
ALT SERPL-CCNC: 13 U/L
ANION GAP SERPL CALC-SCNC: 17 MMOL/L
APPEARANCE: CLEAR
APTT BLD: 35.8 SEC
AST SERPL-CCNC: 26 U/L
BASOPHILS # BLD AUTO: 0.12 K/UL
BASOPHILS NFR BLD AUTO: 0.8 %
BILIRUB SERPL-MCNC: 0.4 MG/DL
BILIRUBIN URINE: NEGATIVE
BLOOD URINE: NEGATIVE
BUN SERPL-MCNC: 9 MG/DL
CALCIUM SERPL-MCNC: 9.9 MG/DL
CHLORIDE SERPL-SCNC: 93 MMOL/L
CO2 SERPL-SCNC: 21 MMOL/L
COLOR: YELLOW
CREAT SERPL-MCNC: 0.66 MG/DL
EOSINOPHIL # BLD AUTO: 0.25 K/UL
EOSINOPHIL NFR BLD AUTO: 1.6 %
GLUCOSE QUALITATIVE U: NEGATIVE
GLUCOSE SERPL-MCNC: 79 MG/DL
HCT VFR BLD CALC: 35.6 %
HGB BLD-MCNC: 11.2 G/DL
IMM GRANULOCYTES NFR BLD AUTO: 0.8 %
INR PPP: 0.94 RATIO
KETONES URINE: NEGATIVE
LEUKOCYTE ESTERASE URINE: NEGATIVE
LYMPHOCYTES # BLD AUTO: 2.93 K/UL
LYMPHOCYTES NFR BLD AUTO: 18.9 %
MAN DIFF?: NORMAL
MCHC RBC-ENTMCNC: 31.5 GM/DL
MCHC RBC-ENTMCNC: 32.6 PG
MCV RBC AUTO: 103.5 FL
MONOCYTES # BLD AUTO: 1.74 K/UL
MONOCYTES NFR BLD AUTO: 11.2 %
NEUTROPHILS # BLD AUTO: 10.34 K/UL
NEUTROPHILS NFR BLD AUTO: 66.7 %
NITRITE URINE: NEGATIVE
PH URINE: 5.5
PLATELET # BLD AUTO: 630 K/UL
POTASSIUM SERPL-SCNC: 4.8 MMOL/L
PROT SERPL-MCNC: 6.9 G/DL
PROTEIN URINE: NEGATIVE
PT BLD: 11.1 SEC
RBC # BLD: 3.44 M/UL
RBC # FLD: 16.8 %
SODIUM SERPL-SCNC: 132 MMOL/L
SPECIFIC GRAVITY URINE: 1.02
UROBILINOGEN URINE: NORMAL
WBC # FLD AUTO: 15.5 K/UL

## 2021-08-29 PROBLEM — Z01.818 PREOP EXAMINATION: Status: ACTIVE | Noted: 2020-11-04

## 2021-08-29 RX ORDER — LETROZOLE TABLETS 2.5 MG/1
2.5 TABLET, FILM COATED ORAL
Qty: 30 | Refills: 1 | Status: COMPLETED | COMMUNITY
Start: 2020-02-27 | End: 2021-08-29

## 2021-08-29 RX ORDER — CLOTRIMAZOLE 10 MG/1
10 LOZENGE ORAL DAILY
Qty: 50 | Refills: 3 | Status: COMPLETED | COMMUNITY
Start: 2020-12-08 | End: 2021-08-29

## 2021-08-29 RX ORDER — NYSTATIN 100000 [USP'U]/ML
100000 SUSPENSION ORAL
Qty: 473 | Refills: 1 | Status: COMPLETED | COMMUNITY
Start: 2020-05-07 | End: 2021-08-29

## 2021-08-29 RX ORDER — ACETAMINOPHEN AND CODEINE 300; 30 MG/1; MG/1
300-30 TABLET ORAL
Qty: 30 | Refills: 0 | Status: COMPLETED | COMMUNITY
Start: 2020-12-01 | End: 2021-08-29

## 2021-08-29 RX ORDER — NICOTINE 21 MG/24HR
14 PATCH, TRANSDERMAL 24 HOURS TRANSDERMAL DAILY
Qty: 60 | Refills: 1 | Status: COMPLETED | COMMUNITY
Start: 2019-07-09 | End: 2021-08-29

## 2021-08-29 RX ORDER — FLUCONAZOLE 150 MG/1
150 TABLET ORAL
Qty: 8 | Refills: 0 | Status: COMPLETED | COMMUNITY
Start: 2020-07-22 | End: 2021-08-29

## 2021-08-29 NOTE — ASSESSMENT
[FreeTextEntry1] : Stable vital signs \par \par hx of tongue cancer , possible recurrence \par \par moderate , asymptomatic coronary artery disease \par \par JAK2 essential thrombocythemia \par \par \par Past hx of breast cancer \par \par

## 2021-08-29 NOTE — DISCUSSION/SUMMARY
[Procedure Low Risk] : the procedure risk is low [Patient Low Risk] : the patient is a low surgical risk [Optimized for Surgery] : the patient is optimized for surgery [Continue] : Continue medications as currently directed [FreeTextEntry3] : low dose aspirin should not be held for this procedure

## 2021-08-29 NOTE — PHYSICAL EXAM
[General Appearance - Well Developed] : well developed [Normal Appearance] : normal appearance [Well Groomed] : well groomed [General Appearance - Well Nourished] : well nourished [No Deformities] : no deformities [General Appearance - In No Acute Distress] : no acute distress [Normal Conjunctiva] : the conjunctiva exhibited no abnormalities [Eyelids - No Xanthelasma] : the eyelids demonstrated no xanthelasmas [No Oral Pallor] : no oral pallor [No Oral Cyanosis] : no oral cyanosis [Respiration, Rhythm And Depth] : normal respiratory rhythm and effort [Auscultation Breath Sounds / Voice Sounds] : lungs were clear to auscultation bilaterally [Exaggerated Use Of Accessory Muscles For Inspiration] : no accessory muscle use [Heart Rate And Rhythm] : heart rate and rhythm were normal [Heart Sounds] : normal S1 and S2 [Murmurs] : no murmurs present [Edema] : no peripheral edema present [Abnormal Walk] : normal gait [Gait - Sufficient For Exercise Testing] : the gait was sufficient for exercise testing [Nail Clubbing] : no clubbing of the fingernails [Cyanosis, Localized] : no localized cyanosis [Petechial Hemorrhages (___cm)] : no petechial hemorrhages [Nail Splinter Hemorrhages] : no splinter hemorrhages of the nails [] : no ischemic changes [Fingers Osler's Nodes] : Osler's nodes were not seenon the fingers [Skin Color & Pigmentation] : normal skin color and pigmentation [Skin Turgor] : normal skin turgor [No Venous Stasis] : no venous stasis [No Skin Ulcers] : no skin ulcer [No Xanthoma] : no  xanthoma was observed [Oriented To Time, Place, And Person] : oriented to person, place, and time [Impaired Insight] : insight and judgment were intact [Mood] : the mood was normal [Affect] : the affect was normal [Memory Recent] : recent memory was not impaired [Memory Remote] : remote memory was not impaired [FreeTextEntry1] : no JVD

## 2021-08-29 NOTE — HISTORY OF PRESENT ILLNESS
[Preoperative Visit] : for a medical evaluation prior to surgery [Scheduled Procedure ___] : a [unfilled] [Date of Surgery ___] : on [unfilled] [Surgeon Name ___] : surgeon: [unfilled] [Stable] : Stable [Dyspnea on Exertion] : difficulty breathing during exertion [Nicotine Dependence] : nicotine dependence [Impaired Immunity] : impaired immunity [Frequent Aspirin Use] : frequent aspirin use [Prior Anesthesia] : Prior anesthesia [Electrocardiogram] : ~T an ECG ~C was performed [Echocardiogram] : ~T an echocardiogram ~C was performed [Cardiovascular Stress Test] : a cardiac stress test ~T ~C was performed [Fair] : Fair [Fever] : no fever [Chills] : no chills [Fatigue] : no fatigue [Chest Pain] : no chest pain [Cough] : no cough [Dyspnea] : no dyspnea [Dysuria] : no dysuria [Urinary Frequency] : no urinary frequency [Nausea] : no nausea [Vomiting] : no vomiting [Diarrhea] : no diarrhea [Abdominal Pain] : no abdominal pain [Easy Bruising] : no easy bruising [Lower Extremity Swelling] : no lower extremity swelling [Poor Exercise Tolerance] : no poor exercise tolerance [Diabetes] : no diabetes [Cardiovascular Disease] : no cardiovascular disease [Pulmonary Disease] : no pulmonary disease [Anti-Platelet Agents] : no anti-platelet agents [Alcohol Use] : no  alcohol use [Renal Disease] : no renal disease [GI Disease] : no gastrointestinal disease [Sleep Apnea] : no sleep apnea [Thromboembolic Problems] : no thromboembolic problems [Clotting Disorder] : no clotting disorder [Frequent use of NSAIDs] : no use of NSAIDs [Bleeding Disorder] : no bleeding disorder [Transfusion Reaction] : no transfusion reaction [Steroid Use in Last 6 Months] : no steroid use in the last six months [Prev Anesthesia Reaction] : no previous anesthesia reaction [FreeTextEntry1] : 76  year old female   with 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  New lesion same side of tongue recently identified,  requires biopsy now. \par \par Hx of  TIA like syndrome  with negative work up, MRI, MRA of head and neck.  Subsequently diagnosed with  JAK2 essential thrombocythemia .  \par \par Heavy smoker  alcohol consumption    for many years and refused to quit despite many attempts at counseling \par \par No hx of MI, CHF, sustained arrhythmias, DVT, pulmonary emboli , strokes or asthma. \par \par EKG shows  NSR 83  bpm with APC and low voltage without ischemia , pathologic Q waves or abnormal axis. \par \par Unable to tolerate hydroxyurea  secondary to severe GI upset and diarrhea . \par \par Recently had negative breast oncology follow up and  stopped letrozole after 5 years post lumpectomy. \par \par  [de-identified] : Tel: [phone]

## 2021-08-29 NOTE — REVIEW OF SYSTEMS
[Weight Gain (___ Lbs)] : [unfilled] ~Ulb weight gain [Feeling Fatigued] : feeling fatigued [Anxiety] : anxiety [Under Stress] : under stress [Easy Bruising] : a tendency for easy bruising [Negative] : Neurological [Weight Loss (___ Lbs)] : no recent weight loss [Lower Ext Edema] : no extremity edema

## 2021-08-29 NOTE — HISTORY OF PRESENT ILLNESS
[Preoperative Visit] : for a medical evaluation prior to surgery [Scheduled Procedure ___] : a [unfilled] [Date of Surgery ___] : on [unfilled] [Surgeon Name ___] : surgeon: [unfilled] [Stable] : Stable [Dyspnea on Exertion] : difficulty breathing during exertion [Nicotine Dependence] : nicotine dependence [Impaired Immunity] : impaired immunity [Frequent Aspirin Use] : frequent aspirin use [Prior Anesthesia] : Prior anesthesia [Electrocardiogram] : ~T an ECG ~C was performed [Echocardiogram] : ~T an echocardiogram ~C was performed [Cardiovascular Stress Test] : a cardiac stress test ~T ~C was performed [Fair] : Fair [Fever] : no fever [Chills] : no chills [Fatigue] : no fatigue [Chest Pain] : no chest pain [Cough] : no cough [Dyspnea] : no dyspnea [Dysuria] : no dysuria [Urinary Frequency] : no urinary frequency [Nausea] : no nausea [Vomiting] : no vomiting [Diarrhea] : no diarrhea [Abdominal Pain] : no abdominal pain [Easy Bruising] : no easy bruising [Lower Extremity Swelling] : no lower extremity swelling [Poor Exercise Tolerance] : no poor exercise tolerance [Diabetes] : no diabetes [Cardiovascular Disease] : no cardiovascular disease [Pulmonary Disease] : no pulmonary disease [Anti-Platelet Agents] : no anti-platelet agents [Alcohol Use] : no  alcohol use [Renal Disease] : no renal disease [GI Disease] : no gastrointestinal disease [Sleep Apnea] : no sleep apnea [Thromboembolic Problems] : no thromboembolic problems [Clotting Disorder] : no clotting disorder [Frequent use of NSAIDs] : no use of NSAIDs [Bleeding Disorder] : no bleeding disorder [Transfusion Reaction] : no transfusion reaction [Steroid Use in Last 6 Months] : no steroid use in the last six months [Prev Anesthesia Reaction] : no previous anesthesia reaction [FreeTextEntry1] : 76  year old female   with 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  New lesion same side of tongue recently identified,  requires biopsy now. \par \par Hx of  TIA like syndrome  with negative work up, MRI, MRA of head and neck.  Subsequently diagnosed with  JAK2 essential thrombocythemia .  \par \par Heavy smoker  alcohol consumption    for many years and refused to quit despite many attempts at counseling \par \par No hx of MI, CHF, sustained arrhythmias, DVT, pulmonary emboli , strokes or asthma. \par \par EKG shows  NSR 83  bpm with APC and low voltage without ischemia , pathologic Q waves or abnormal axis. \par \par Unable to tolerate hydroxyurea  secondary to severe GI upset and diarrhea . \par \par Recently had negative breast oncology follow up and  stopped letrozole after 5 years post lumpectomy. \par \par  [de-identified] : Tel: [phone]

## 2021-08-29 NOTE — PHYSICAL EXAM
[General Appearance - Well Developed] : well developed [Normal Appearance] : normal appearance [Well Groomed] : well groomed [General Appearance - Well Nourished] : well nourished [No Deformities] : no deformities [General Appearance - In No Acute Distress] : no acute distress [Normal Conjunctiva] : the conjunctiva exhibited no abnormalities [Eyelids - No Xanthelasma] : the eyelids demonstrated no xanthelasmas [No Oral Pallor] : no oral pallor [No Oral Cyanosis] : no oral cyanosis [Respiration, Rhythm And Depth] : normal respiratory rhythm and effort [Auscultation Breath Sounds / Voice Sounds] : lungs were clear to auscultation bilaterally [Exaggerated Use Of Accessory Muscles For Inspiration] : no accessory muscle use [Heart Rate And Rhythm] : heart rate and rhythm were normal [Heart Sounds] : normal S1 and S2 [Murmurs] : no murmurs present [Edema] : no peripheral edema present [Abnormal Walk] : normal gait [Gait - Sufficient For Exercise Testing] : the gait was sufficient for exercise testing [Nail Clubbing] : no clubbing of the fingernails [Petechial Hemorrhages (___cm)] : no petechial hemorrhages [Cyanosis, Localized] : no localized cyanosis [Nail Splinter Hemorrhages] : no splinter hemorrhages of the nails [Fingers Osler's Nodes] : Osler's nodes were not seenon the fingers [] : no ischemic changes [Skin Color & Pigmentation] : normal skin color and pigmentation [Skin Turgor] : normal skin turgor [No Skin Ulcers] : no skin ulcer [No Venous Stasis] : no venous stasis [No Xanthoma] : no  xanthoma was observed [Oriented To Time, Place, And Person] : oriented to person, place, and time [Impaired Insight] : insight and judgment were intact [Affect] : the affect was normal [Mood] : the mood was normal [Memory Recent] : recent memory was not impaired [Memory Remote] : remote memory was not impaired [FreeTextEntry1] : no JVD

## 2021-09-01 ENCOUNTER — TRANSCRIPTION ENCOUNTER (OUTPATIENT)
Age: 76
End: 2021-09-01

## 2021-09-01 LAB — SARS-COV-2 N GENE NPH QL NAA+PROBE: NOT DETECTED

## 2021-09-02 ENCOUNTER — RESULT REVIEW (OUTPATIENT)
Age: 76
End: 2021-09-02

## 2021-09-02 ENCOUNTER — APPOINTMENT (OUTPATIENT)
Dept: OTOLARYNGOLOGY | Facility: AMBULATORY SURGERY CENTER | Age: 76
End: 2021-09-02

## 2021-09-02 ENCOUNTER — OUTPATIENT (OUTPATIENT)
Dept: OUTPATIENT SERVICES | Facility: HOSPITAL | Age: 76
LOS: 1 days | Discharge: HOME | End: 2021-09-02
Payer: MEDICARE

## 2021-09-02 DIAGNOSIS — Z90.711 ACQUIRED ABSENCE OF UTERUS WITH REMAINING CERVICAL STUMP: Chronic | ICD-10-CM

## 2021-09-02 DIAGNOSIS — Z98.890 OTHER SPECIFIED POSTPROCEDURAL STATES: Chronic | ICD-10-CM

## 2021-09-02 DIAGNOSIS — Z90.89 ACQUIRED ABSENCE OF OTHER ORGANS: Chronic | ICD-10-CM

## 2021-09-02 LAB — SARS-COV-2 RNA SPEC QL NAA+PROBE: NEGATIVE — SIGNIFICANT CHANGE UP

## 2021-09-02 PROCEDURE — 88360 TUMOR IMMUNOHISTOCHEM/MANUAL: CPT | Mod: 26

## 2021-09-02 PROCEDURE — 88307 TISSUE EXAM BY PATHOLOGIST: CPT | Mod: 26

## 2021-09-02 PROCEDURE — 42890 LIMITED PHARYNGECTOMY: CPT

## 2021-09-02 PROCEDURE — 88341 IMHCHEM/IMCYTCHM EA ADD ANTB: CPT | Mod: 26,59

## 2021-09-02 PROCEDURE — 88342 IMHCHEM/IMCYTCHM 1ST ANTB: CPT | Mod: 26,59

## 2021-09-14 LAB — SURGICAL PATHOLOGY STUDY: SIGNIFICANT CHANGE UP

## 2021-09-15 ENCOUNTER — APPOINTMENT (OUTPATIENT)
Dept: OTOLARYNGOLOGY | Facility: CLINIC | Age: 76
End: 2021-09-15

## 2021-09-22 ENCOUNTER — APPOINTMENT (OUTPATIENT)
Dept: OTOLARYNGOLOGY | Facility: CLINIC | Age: 76
End: 2021-09-22
Payer: MEDICARE

## 2021-09-22 PROCEDURE — 31575 DIAGNOSTIC LARYNGOSCOPY: CPT | Mod: 58

## 2021-09-22 PROCEDURE — 99024 POSTOP FOLLOW-UP VISIT: CPT

## 2021-09-22 RX ORDER — ACETAMINOPHEN AND CODEINE 300; 30 MG/1; MG/1
300-30 TABLET ORAL
Qty: 30 | Refills: 0 | Status: COMPLETED | COMMUNITY
Start: 2021-08-29 | End: 2021-09-22

## 2021-09-22 RX ORDER — CLOTRIMAZOLE 10 MG/1
10 LOZENGE ORAL DAILY
Qty: 50 | Refills: 3 | Status: COMPLETED | COMMUNITY
Start: 2021-08-12 | End: 2021-09-22

## 2021-09-22 NOTE — CONSULT LETTER
[Dear  ___] : Dear  [unfilled], [Consult Letter:] : I had the pleasure of evaluating your patient, [unfilled]. [Please see my note below.] : Please see my note below. [Consult Closing:] : Thank you very much for allowing me to participate in the care of this patient.  If you have any questions, please do not hesitate to contact me. [Sincerely,] : Sincerely, [DrRosalie  ___] : Dr. TILLMAN [FreeTextEntry3] : Clarence Mccarthy MD, FACS\par Professor of Otolaryngology, [employer] at [facility]\par Director, [facility], Department of Otolaryngology, [facility]\par , Head & Neck Service Line, [facility]\par

## 2021-09-22 NOTE — PROCEDURE
[Image(s) Captured] : image(s) captured and filed [Topical Lidocaine] : topical lidocaine [Flexible Endoscope] : examined with the flexible endoscope [Serial Number: ___] : Serial Number: [unfilled] [de-identified] : Pharyngeal lesion.

## 2021-09-22 NOTE — HISTORY OF PRESENT ILLNESS
[de-identified] : 76 years old female patient with history of Pharyngeal lesion for the past couple of months.   Patient is present today in the office with Pharyngeal Lesion

## 2021-09-22 NOTE — HISTORY OF PRESENT ILLNESS
[de-identified] : 76 years old female patient with history of Pharyngeal lesion for the past couple of months.   Patient is present today in the office with Pharyngeal Lesion

## 2021-09-22 NOTE — REASON FOR VISIT
[Subsequent Evaluation] : a subsequent evaluation for [FreeTextEntry2] : Pharyngeal lesion for the past couple of months.

## 2021-09-22 NOTE — REASON FOR VISIT
[Subsequent Evaluation] : a subsequent evaluation for [FreeTextEntry2] : Status post D/L and biopsy of Pharyngeal lesion Part of Pharynx

## 2021-09-22 NOTE — PROCEDURE
[Image(s) Captured] : image(s) captured and filed [Topical Lidocaine] : topical lidocaine [Flexible Endoscope] : examined with the flexible endoscope [Serial Number: ___] : Serial Number: [unfilled] [de-identified] : Pharyngeal lesion.

## 2021-09-22 NOTE — PROCEDURE
[Image(s) Captured] : image(s) captured and filed [Topical Lidocaine] : topical lidocaine [Flexible Endoscope] : examined with the flexible endoscope [Serial Number: ___] : Serial Number: [unfilled] [de-identified] : Pharyngeal lesion.

## 2021-09-22 NOTE — HISTORY OF PRESENT ILLNESS
[de-identified] : 76 years old female patient with history of Status post D/L and biopsy of Pharyngeal lesion Part of Pharynx.  Patient is present today in the office biopsy site is healing well.  Patient will  F/U with [facility] tumor board for further evaluation \par Healing ok Referred to Dr Jonathan Rodriges for further Rx and Tumor Board presentation

## 2021-09-27 ENCOUNTER — APPOINTMENT (OUTPATIENT)
Dept: OTOLARYNGOLOGY | Facility: CLINIC | Age: 76
End: 2021-09-27
Payer: MEDICARE

## 2021-09-27 PROCEDURE — 99024 POSTOP FOLLOW-UP VISIT: CPT

## 2021-09-27 PROCEDURE — 31575 DIAGNOSTIC LARYNGOSCOPY: CPT | Mod: 58

## 2021-09-30 ENCOUNTER — APPOINTMENT (OUTPATIENT)
Dept: CT IMAGING | Facility: CLINIC | Age: 76
End: 2021-09-30

## 2021-09-30 ENCOUNTER — OUTPATIENT (OUTPATIENT)
Dept: OUTPATIENT SERVICES | Facility: HOSPITAL | Age: 76
LOS: 1 days | End: 2021-09-30
Payer: MEDICARE

## 2021-09-30 ENCOUNTER — APPOINTMENT (OUTPATIENT)
Dept: CT IMAGING | Facility: HOSPITAL | Age: 76
End: 2021-09-30

## 2021-09-30 DIAGNOSIS — Z98.890 OTHER SPECIFIED POSTPROCEDURAL STATES: Chronic | ICD-10-CM

## 2021-09-30 DIAGNOSIS — Z90.89 ACQUIRED ABSENCE OF OTHER ORGANS: Chronic | ICD-10-CM

## 2021-09-30 DIAGNOSIS — Z90.711 ACQUIRED ABSENCE OF UTERUS WITH REMAINING CERVICAL STUMP: Chronic | ICD-10-CM

## 2021-09-30 PROCEDURE — 70491 CT SOFT TISSUE NECK W/DYE: CPT | Mod: 26,MH

## 2021-09-30 PROCEDURE — 70491 CT SOFT TISSUE NECK W/DYE: CPT

## 2021-10-02 ENCOUNTER — RX RENEWAL (OUTPATIENT)
Age: 76
End: 2021-10-02

## 2021-10-02 RX ORDER — FOLIC ACID 1 MG/1
1 TABLET ORAL TWICE DAILY
Qty: 180 | Refills: 1 | Status: ACTIVE | COMMUNITY
Start: 2019-05-29 | End: 1900-01-01

## 2021-10-03 ENCOUNTER — EMERGENCY (EMERGENCY)
Facility: HOSPITAL | Age: 76
LOS: 1 days | Discharge: HOME | End: 2021-10-03
Attending: EMERGENCY MEDICINE | Admitting: EMERGENCY MEDICINE
Payer: MEDICARE

## 2021-10-03 VITALS
DIASTOLIC BLOOD PRESSURE: 61 MMHG | RESPIRATION RATE: 16 BRPM | TEMPERATURE: 98 F | OXYGEN SATURATION: 96 % | HEIGHT: 64 IN | WEIGHT: 113.98 LBS | SYSTOLIC BLOOD PRESSURE: 91 MMHG | HEART RATE: 86 BPM

## 2021-10-03 DIAGNOSIS — E78.5 HYPERLIPIDEMIA, UNSPECIFIED: ICD-10-CM

## 2021-10-03 DIAGNOSIS — Z86.2 PERSONAL HISTORY OF DISEASES OF THE BLOOD AND BLOOD-FORMING ORGANS AND CERTAIN DISORDERS INVOLVING THE IMMUNE MECHANISM: ICD-10-CM

## 2021-10-03 DIAGNOSIS — Z91.013 ALLERGY TO SEAFOOD: ICD-10-CM

## 2021-10-03 DIAGNOSIS — R55 SYNCOPE AND COLLAPSE: ICD-10-CM

## 2021-10-03 DIAGNOSIS — Z85.01 PERSONAL HISTORY OF MALIGNANT NEOPLASM OF ESOPHAGUS: ICD-10-CM

## 2021-10-03 DIAGNOSIS — Z90.89 ACQUIRED ABSENCE OF OTHER ORGANS: Chronic | ICD-10-CM

## 2021-10-03 DIAGNOSIS — I10 ESSENTIAL (PRIMARY) HYPERTENSION: ICD-10-CM

## 2021-10-03 DIAGNOSIS — G47.33 OBSTRUCTIVE SLEEP APNEA (ADULT) (PEDIATRIC): ICD-10-CM

## 2021-10-03 DIAGNOSIS — F41.9 ANXIETY DISORDER, UNSPECIFIED: ICD-10-CM

## 2021-10-03 DIAGNOSIS — Z85.3 PERSONAL HISTORY OF MALIGNANT NEOPLASM OF BREAST: ICD-10-CM

## 2021-10-03 DIAGNOSIS — F32.9 MAJOR DEPRESSIVE DISORDER, SINGLE EPISODE, UNSPECIFIED: ICD-10-CM

## 2021-10-03 DIAGNOSIS — J44.9 CHRONIC OBSTRUCTIVE PULMONARY DISEASE, UNSPECIFIED: ICD-10-CM

## 2021-10-03 DIAGNOSIS — Z98.890 OTHER SPECIFIED POSTPROCEDURAL STATES: Chronic | ICD-10-CM

## 2021-10-03 DIAGNOSIS — Z20.822 CONTACT WITH AND (SUSPECTED) EXPOSURE TO COVID-19: ICD-10-CM

## 2021-10-03 DIAGNOSIS — Z91.012 ALLERGY TO EGGS: ICD-10-CM

## 2021-10-03 DIAGNOSIS — F41.8 OTHER SPECIFIED ANXIETY DISORDERS: ICD-10-CM

## 2021-10-03 DIAGNOSIS — Z91.018 ALLERGY TO OTHER FOODS: ICD-10-CM

## 2021-10-03 DIAGNOSIS — I25.10 ATHEROSCLEROTIC HEART DISEASE OF NATIVE CORONARY ARTERY WITHOUT ANGINA PECTORIS: ICD-10-CM

## 2021-10-03 DIAGNOSIS — Z90.711 ACQUIRED ABSENCE OF UTERUS WITH REMAINING CERVICAL STUMP: Chronic | ICD-10-CM

## 2021-10-03 DIAGNOSIS — K90.0 CELIAC DISEASE: ICD-10-CM

## 2021-10-03 LAB
ALBUMIN SERPL ELPH-MCNC: 3.8 G/DL — SIGNIFICANT CHANGE UP (ref 3.3–5)
ALP SERPL-CCNC: 85 U/L — SIGNIFICANT CHANGE UP (ref 40–120)
ALT FLD-CCNC: 9 U/L — LOW (ref 10–45)
ANION GAP SERPL CALC-SCNC: 16 MMOL/L — SIGNIFICANT CHANGE UP (ref 5–17)
APTT BLD: 30 SEC — SIGNIFICANT CHANGE UP (ref 27.5–35.5)
AST SERPL-CCNC: 31 U/L — SIGNIFICANT CHANGE UP (ref 10–40)
BASOPHILS # BLD AUTO: 0.09 K/UL — SIGNIFICANT CHANGE UP (ref 0–0.2)
BASOPHILS NFR BLD AUTO: 0.8 % — SIGNIFICANT CHANGE UP (ref 0–2)
BILIRUB SERPL-MCNC: 0.3 MG/DL — SIGNIFICANT CHANGE UP (ref 0.2–1.2)
BUN SERPL-MCNC: 9 MG/DL — SIGNIFICANT CHANGE UP (ref 7–23)
CALCIUM SERPL-MCNC: 9.9 MG/DL — SIGNIFICANT CHANGE UP (ref 8.4–10.5)
CHLORIDE SERPL-SCNC: 97 MMOL/L — SIGNIFICANT CHANGE UP (ref 96–108)
CO2 SERPL-SCNC: 22 MMOL/L — SIGNIFICANT CHANGE UP (ref 22–31)
CREAT SERPL-MCNC: 0.92 MG/DL — SIGNIFICANT CHANGE UP (ref 0.5–1.3)
D DIMER BLD IA.RAPID-MCNC: 245 NG/ML DDU — HIGH
EOSINOPHIL # BLD AUTO: 0.13 K/UL — SIGNIFICANT CHANGE UP (ref 0–0.5)
EOSINOPHIL NFR BLD AUTO: 1.1 % — SIGNIFICANT CHANGE UP (ref 0–6)
GLUCOSE SERPL-MCNC: 89 MG/DL — SIGNIFICANT CHANGE UP (ref 70–99)
HCT VFR BLD CALC: 31.8 % — LOW (ref 34.5–45)
HGB BLD-MCNC: 10.9 G/DL — LOW (ref 11.5–15.5)
IMM GRANULOCYTES NFR BLD AUTO: 0.8 % — SIGNIFICANT CHANGE UP (ref 0–1.5)
INR BLD: 1.01 — SIGNIFICANT CHANGE UP (ref 0.88–1.16)
LYMPHOCYTES # BLD AUTO: 1.53 K/UL — SIGNIFICANT CHANGE UP (ref 1–3.3)
LYMPHOCYTES # BLD AUTO: 13.5 % — SIGNIFICANT CHANGE UP (ref 13–44)
MCHC RBC-ENTMCNC: 33.4 PG — SIGNIFICANT CHANGE UP (ref 27–34)
MCHC RBC-ENTMCNC: 34.3 GM/DL — SIGNIFICANT CHANGE UP (ref 32–36)
MCV RBC AUTO: 97.5 FL — SIGNIFICANT CHANGE UP (ref 80–100)
MONOCYTES # BLD AUTO: 1.02 K/UL — HIGH (ref 0–0.9)
MONOCYTES NFR BLD AUTO: 9 % — SIGNIFICANT CHANGE UP (ref 2–14)
NEUTROPHILS # BLD AUTO: 8.5 K/UL — HIGH (ref 1.8–7.4)
NEUTROPHILS NFR BLD AUTO: 74.8 % — SIGNIFICANT CHANGE UP (ref 43–77)
NRBC # BLD: 0 /100 WBCS — SIGNIFICANT CHANGE UP (ref 0–0)
NT-PROBNP SERPL-SCNC: 266 PG/ML — SIGNIFICANT CHANGE UP (ref 0–300)
PLATELET # BLD AUTO: 368 K/UL — SIGNIFICANT CHANGE UP (ref 150–400)
POTASSIUM SERPL-MCNC: 4.5 MMOL/L — SIGNIFICANT CHANGE UP (ref 3.5–5.3)
POTASSIUM SERPL-SCNC: 4.5 MMOL/L — SIGNIFICANT CHANGE UP (ref 3.5–5.3)
PROT SERPL-MCNC: 6.8 G/DL — SIGNIFICANT CHANGE UP (ref 6–8.3)
PROTHROM AB SERPL-ACNC: 12.1 SEC — SIGNIFICANT CHANGE UP (ref 10.6–13.6)
RBC # BLD: 3.26 M/UL — LOW (ref 3.8–5.2)
RBC # FLD: 16.9 % — HIGH (ref 10.3–14.5)
SARS-COV-2 RNA SPEC QL NAA+PROBE: NEGATIVE — SIGNIFICANT CHANGE UP
SODIUM SERPL-SCNC: 135 MMOL/L — SIGNIFICANT CHANGE UP (ref 135–145)
TROPONIN T SERPL-MCNC: 0.01 NG/ML — SIGNIFICANT CHANGE UP (ref 0–0.01)
WBC # BLD: 11.36 K/UL — HIGH (ref 3.8–10.5)
WBC # FLD AUTO: 11.36 K/UL — HIGH (ref 3.8–10.5)

## 2021-10-03 PROCEDURE — 85610 PROTHROMBIN TIME: CPT

## 2021-10-03 PROCEDURE — 85730 THROMBOPLASTIN TIME PARTIAL: CPT

## 2021-10-03 PROCEDURE — 85025 COMPLETE CBC W/AUTO DIFF WBC: CPT

## 2021-10-03 PROCEDURE — 85379 FIBRIN DEGRADATION QUANT: CPT

## 2021-10-03 PROCEDURE — 80053 COMPREHEN METABOLIC PANEL: CPT

## 2021-10-03 PROCEDURE — 84484 ASSAY OF TROPONIN QUANT: CPT

## 2021-10-03 PROCEDURE — 83880 ASSAY OF NATRIURETIC PEPTIDE: CPT

## 2021-10-03 PROCEDURE — 82962 GLUCOSE BLOOD TEST: CPT

## 2021-10-03 PROCEDURE — 99285 EMERGENCY DEPT VISIT HI MDM: CPT

## 2021-10-03 PROCEDURE — 99283 EMERGENCY DEPT VISIT LOW MDM: CPT

## 2021-10-03 PROCEDURE — 36415 COLL VENOUS BLD VENIPUNCTURE: CPT

## 2021-10-03 PROCEDURE — 87635 SARS-COV-2 COVID-19 AMP PRB: CPT

## 2021-10-03 PROCEDURE — 93005 ELECTROCARDIOGRAM TRACING: CPT

## 2021-10-03 NOTE — ED ADULT NURSE NOTE - CHIEF COMPLAINT QUOTE
Pt states she was coming home from [facility] with her  when he fell and hit his head. Pt states she became nervous and had syncopal event, denies head injury. On Aspirin. Denies chest pain, shortness of breath, n/v/d, fevers, vision changes.

## 2021-10-03 NOTE — ED ADULT NURSE NOTE - NSIMPLEMENTINTERV_GEN_ALL_ED
Implemented All Fall Risk Interventions:  [place] to call system. Call bell, personal items and telephone within reach. Instruct patient to call for assistance. Room bathroom lighting operational. Non-slip footwear when patient is off stretcher. Physically safe environment: no spills, clutter or unnecessary equipment. Stretcher in lowest position, wheels locked, appropriate side rails in place. Provide visual cue, wrist band, yellow gown, etc. Monitor gait and stability. Monitor for mental status changes and reorient to person, place, and time. Review medications for side effects contributing to fall risk. Reinforce activity limits and safety measures with patient and family.

## 2021-10-03 NOTE — ED ADULT NURSE NOTE - OBJECTIVE STATEMENT
Pt BIBA to ED w/  s/p syncopal episode. Pt states was out for valentin with her , had a total of 2 scotches with a hamburger deluxe. Pt states her  fell and she suddenly felt dizzy and woke up on the side walk. Pt unsure what happened, denies hitting head, denies N/V, blood thinners.

## 2021-10-03 NOTE — ED PROVIDER NOTE - OBJECTIVE STATEMENT
75 y/o F pt w/ PMHx of Polycythemia, 75 y/o F pt w/ PMHx of polycythemia being treated with hydroxyurea, breast CA s/p lumpectomy 2016, esophageal CA s/p surgery 2020, HTN, non-obstructive CAD, presents to the ED c/o an syncopal episode after seeing her  fall 75 y/o F pt w/ PMHx of polycythemia being treated with hydroxyurea, breast CA s/p lumpectomy 2016, esophageal CA s/p surgery 2020, HTN, non-obstructive CAD, presents to the ED c/o an syncopal episode after seeing her  fall today. States that she has never fainted before. 77 y/o F pt w/ PMHx of polycythemia being treated with hydroxyurea, breast CA s/p lumpectomy 2016, esophageal CA s/p surgery 2020, HTN, non-obstructive CAD, presents to the ED c/o an syncopal episode after seeing her  fall today. States that she has never fainted before. The patient states that they were out together eating lunch and her  drank too much alcohol. After lunch, while they were walking together on the way home, her  leaned against a corner of a wall, lost his balance, and fell. The patient panicked and began to call her friends. Then suddenly she lost consciousness for a few seconds without hitting her head or any trauma. 77 y/o F pt w/ PMHx of polycythemia being treated with hydroxyurea, breast CA s/p lumpectomy 2016, esophageal CA s/p surgery 2020, HTN, non-obstructive CAD, no use of anticoagulants, new diagnosis of invasive CA, sonogram done at [facility] showed no thrombosis a few weeks per patient, recent CAT scan in [facility], presents to the ED c/o an syncopal episode after seeing her  fall today. States that she has never fainted before. Reports that they were out together eating lunch and her  drank too much alcohol. After lunch, while they were walking together on the way home, her  leaned against a corner of a wall, lost his balance, and fell. The patient panicked and began to call her friends. After calling her friends, she suddenly she lost consciousness for a few seconds without hitting her head or any trauma. Denies CP, SOB, abdominal pain, nausea, vomiting, diarrhea, numbness, weakness, tingling, fevers, chills, or any other complaints. 77 y/o F pt w/ PMHx of polycythemia being treated with hydroxyurea, breast CA s/p lumpectomy 2016, esophageal CA s/p surgery 2020, HTN, non-obstructive CAD, no use of anticoagulants, new diagnosis of invasive CA, sonogram done at [facility] showed no thrombosis a few weeks per patient, recent CAT scan in [facility], presents to the ED c/o an syncopal episode after seeing her  fall today. States that she has never fainted before. Reports that they were out together eating lunch and her  drank too much alcohol. After lunch, while they were walking together on the way home, her  leaned against a corner of a wall, lost his balance, and fell. The patient panicked and began to call her friends. After calling her friends, she suddenly lost consciousness for a few seconds without hitting her head or any trauma. Denies CP, SOB, abdominal pain, nausea, vomiting, diarrhea, numbness, weakness, tingling, fevers, chills, or any other complaints.

## 2021-10-03 NOTE — ED PROVIDER NOTE - CLINICAL SUMMARY MEDICAL DECISION MAKING FREE TEXT BOX
75 y/o F pt presents to the ED c/o a syncopal episode today after watching her  fall. Ordered blood work to r/o the possibility of PE and blood clots. Labs show negative age related d-dimer. Counseled the patient about the risk of blood clots and PE. Will discharge with strict return precautions.

## 2021-10-03 NOTE — ED PROVIDER NOTE - NSICDXPASTMEDICALHX_GEN_ALL_CORE_FT
PAST MEDICAL HISTORY:  Anxiety and depression     Breast cancer L sided DCIS    CAD (coronary artery disease)     Celiac disease     COPD (chronic obstructive pulmonary disease)     Dyslipidemia     Esophageal cancer     Hypertension     Malignant neoplasm of tongue     WAQAR (obstructive sleep apnea)

## 2021-10-03 NOTE — ED ADULT TRIAGE NOTE - CHIEF COMPLAINT QUOTE
Pt states she was coming home from [facility] with her  when he fell and hit his head. Pt states she became nervous and had syncopal event, denies head injury. On Aspirin. Denies chest pain, shortness of breath, n/v/d, fevers, vision changes.

## 2021-10-03 NOTE — ED PROVIDER NOTE - NSICDXPASTSURGICALHX_GEN_ALL_CORE_FT
PAST SURGICAL HISTORY:  History of esophageal surgery     History of partial hysterectomy with oopharectomy and fibroidectomy    History of tonsillectomy     S/P lumpectomy, left breast

## 2021-10-03 NOTE — ED PROVIDER NOTE - PATIENT PORTAL LINK FT
You can access the FollowMyHealth Patient Portal offered by [facility] by registering at the following website: [email or web address]. By joining [facility]’s FollowMyHealth portal, you will also be able to view your health information using other applications (apps) compatible with our system.

## 2021-10-03 NOTE — ED ADULT NURSE NOTE - CHPI ED NUR SYMPTOMS NEG
no back pain/no chest pain/no chills/no congestion/no fever/no nausea/no shortness of breath/no vomiting

## 2021-10-04 ENCOUNTER — APPOINTMENT (OUTPATIENT)
Dept: OTOLARYNGOLOGY | Facility: CLINIC | Age: 76
End: 2021-10-04

## 2021-10-07 ENCOUNTER — APPOINTMENT (OUTPATIENT)
Dept: HEART AND VASCULAR | Facility: CLINIC | Age: 76
End: 2021-10-07
Payer: MEDICARE

## 2021-10-07 VITALS
DIASTOLIC BLOOD PRESSURE: 74 MMHG | TEMPERATURE: 97.8 F | BODY MASS INDEX: 20.2 KG/M2 | OXYGEN SATURATION: 97 % | HEIGHT: 63 IN | SYSTOLIC BLOOD PRESSURE: 122 MMHG | HEART RATE: 99 BPM | WEIGHT: 114 LBS

## 2021-10-07 DIAGNOSIS — E78.5 HYPERLIPIDEMIA, UNSPECIFIED: ICD-10-CM

## 2021-10-07 DIAGNOSIS — Z23 ENCOUNTER FOR IMMUNIZATION: ICD-10-CM

## 2021-10-07 DIAGNOSIS — I25.10 ATHEROSCLEROTIC HEART DISEASE OF NATIVE CORONARY ARTERY W/OUT ANGINA PECTORIS: ICD-10-CM

## 2021-10-07 PROCEDURE — 99214 OFFICE O/P EST MOD 30 MIN: CPT

## 2021-10-07 PROCEDURE — G0008: CPT

## 2021-10-07 PROCEDURE — 90662 IIV NO PRSV INCREASED AG IM: CPT

## 2021-10-09 PROBLEM — I25.10 CAD (CORONARY ARTERY DISEASE): Status: ACTIVE | Noted: 2021-08-29

## 2021-10-09 PROBLEM — E78.5 DYSLIPIDEMIA: Status: ACTIVE | Noted: 2019-06-02

## 2021-10-09 NOTE — ASSESSMENT
[FreeTextEntry1] : locally metastatic tongue cancer \par \par SARIAH 2 thrombocytosis \par \par smoker\par \par mild CAD \par \par hx of esophageal cancer and breast cancers

## 2021-10-09 NOTE — DISCUSSION/SUMMARY
[Stable] : stable [With Me] : with me [FreeTextEntry1] : HD fluzone administered  without incident \par \par Proceed with radiation oncology evaluation \par \par See dentist  for  oral management /  Biotene to keep mouth moist\par \par Smoking cessation \par \par Wait 2 weeks for Pfizer covid booster

## 2021-10-09 NOTE — REASON FOR VISIT
[Symptom and Test Evaluation] : symptom and test evaluation [Other: ____] : [unfilled] [FreeTextEntry1] : 76  year old chronic heavy smoker  with hx of  DCIS of left breast  and  CAD  has hx of statin induced transaminitis . S/P resection of right sided tongue squamous cell carcinoma in December 2020 , she was recently diagnosed with  moderately differentiated invasive squamous  carcinoma 1.6cm  in right pharyngeal area  and is  set to see radiation oncologist for further treatment. 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 Her platelet counts are now normalized at 368 K

## 2021-10-09 NOTE — PHYSICAL EXAM
[Well Developed] : well developed [No Acute Distress] : no acute distress [Well Nourished] : well nourished [Normal Conjunctiva] : normal conjunctiva [No Xanthelasma] : no xanthelasma [Normal Venous Pressure] : normal venous pressure [No Carotid Bruit] : no carotid bruit [Normal S1, S2] : normal S1, S2 [No Murmur] : no murmur [No Rub] : no rub [No Gallop] : no gallop [Clear Lung Fields] : clear lung fields [Good Air Entry] : good air entry [No Respiratory Distress] : no respiratory distress  [Soft] : abdomen soft [Non Tender] : non-tender [Normal Bowel Sounds] : normal bowel sounds [No Masses/organomegaly] : no masses/organomegaly [Normal Gait] : normal gait [Gait - Sufficient for Exercise Testing] : gait - sufficient for exercise testing [No Cyanosis] : no cyanosis [No Clubbing] : no clubbing [No Varicosities] : no varicosities [Edema ___] : edema [unfilled] [No Rash] : no rash [No Skin Lesions] : no skin lesions [Moves all extremities] : moves all extremities [No Focal Deficits] : no focal deficits [Alert and Oriented] : alert and oriented [Normal memory] : normal memory [de-identified] : anicteric  [de-identified] : chronic hoarse voice

## 2021-10-09 NOTE — REVIEW OF SYSTEMS
[Weight Gain (___ Lbs)] : no recent weight gain [Feeling Fatigued] : feeling fatigued [Weight Loss (___ Lbs)] : no recent weight loss [Lower Ext Edema] : no extremity edema [Anxiety] : anxiety [Easy Bruising] : a tendency for easy bruising [Under Stress] : under stress [Negative] : Neurological [FreeTextEntry4] : chronic hoarse voice

## 2021-10-09 NOTE — HISTORY OF PRESENT ILLNESS
[FreeTextEntry1] : Patient needs  flu vaccine today\par \par Currently her swallowing is normal \par \par Reports recent syncope during her husbands illness  . She explains she was exhausted and  mildly dehydrated\par \par Trying to stop smoking \par \par Denies palpitations, orthopnea, chest pain , dyspnea\par \par She is under significant stress dealing with her illness and her husbands psychiatric illness / bipolar disorder with alcohol and substance abuse \par \par

## 2021-10-11 ENCOUNTER — APPOINTMENT (OUTPATIENT)
Dept: HEMATOLOGY ONCOLOGY | Facility: CLINIC | Age: 76
End: 2021-10-11
Payer: MEDICARE

## 2021-10-11 VITALS
HEIGHT: 63 IN | RESPIRATION RATE: 18 BRPM | OXYGEN SATURATION: 97 % | DIASTOLIC BLOOD PRESSURE: 77 MMHG | WEIGHT: 115 LBS | BODY MASS INDEX: 20.38 KG/M2 | HEART RATE: 83 BPM | TEMPERATURE: 98.2 F | SYSTOLIC BLOOD PRESSURE: 118 MMHG

## 2021-10-11 PROCEDURE — 99205 OFFICE O/P NEW HI 60 MIN: CPT

## 2021-10-11 RX ORDER — AZITHROMYCIN 250 MG/1
250 TABLET, FILM COATED ORAL
Qty: 1 | Refills: 0 | Status: DISCONTINUED | COMMUNITY
Start: 2021-08-29 | End: 2021-10-11

## 2021-10-11 RX ORDER — SUCRALFATE 1 G/1
1 TABLET ORAL
Qty: 30 | Refills: 0 | Status: DISCONTINUED | COMMUNITY
Start: 2020-11-20 | End: 2021-10-11

## 2021-10-11 RX ORDER — ZOLPIDEM TARTRATE 5 MG/1
5 TABLET ORAL
Qty: 30 | Refills: 0 | Status: DISCONTINUED | COMMUNITY
Start: 2018-12-18 | End: 2021-10-11

## 2021-10-11 RX ORDER — DOCUSATE SODIUM 100 MG/1
100 CAPSULE ORAL TWICE DAILY
Qty: 60 | Refills: 0 | Status: DISCONTINUED | COMMUNITY
Start: 2021-08-29 | End: 2021-10-11

## 2021-10-11 RX ORDER — AMOXICILLIN AND CLAVULANATE POTASSIUM 875; 125 MG/1; MG/1
875-125 TABLET, COATED ORAL
Qty: 10 | Refills: 0 | Status: DISCONTINUED | COMMUNITY
Start: 2020-11-20 | End: 2021-10-11

## 2021-10-11 RX ORDER — FLUOXETINE HYDROCHLORIDE 10 MG/1
10 CAPSULE ORAL
Qty: 30 | Refills: 0 | Status: DISCONTINUED | COMMUNITY
Start: 2019-04-18 | End: 2021-10-11

## 2021-10-11 RX ORDER — ALLOPURINOL 300 MG/1
300 TABLET ORAL DAILY
Qty: 30 | Refills: 0 | Status: ACTIVE | COMMUNITY
Start: 2021-10-11 | End: 1900-01-01

## 2021-10-11 RX ORDER — METHYLPREDNISOLONE 4 MG/1
4 TABLET ORAL
Qty: 1 | Refills: 0 | Status: DISCONTINUED | COMMUNITY
Start: 2021-08-29 | End: 2021-10-11

## 2021-10-11 RX ORDER — EXEMESTANE 25 MG/1
TABLET, FILM COATED ORAL
Refills: 0 | Status: ACTIVE | COMMUNITY

## 2021-10-11 RX ORDER — FLUOXETINE HYDROCHLORIDE 10 MG/1
10 TABLET ORAL
Refills: 0 | Status: ACTIVE | COMMUNITY

## 2021-10-11 RX ORDER — POLYETHYLENE GLYCOL-3350 AND ELECTROLYTES 236; 6.74; 5.86; 2.97; 22.74 G/274.31G; G/274.31G; G/274.31G; G/274.31G; G/274.31G
236 POWDER, FOR SOLUTION ORAL
Qty: 4000 | Refills: 0 | Status: DISCONTINUED | COMMUNITY
Start: 2020-08-14 | End: 2021-10-11

## 2021-10-11 RX ORDER — ATORVASTATIN CALCIUM 40 MG/1
40 TABLET, FILM COATED ORAL
Refills: 0 | Status: ACTIVE | COMMUNITY

## 2021-10-14 ENCOUNTER — APPOINTMENT (OUTPATIENT)
Dept: RADIATION ONCOLOGY | Facility: CLINIC | Age: 76
End: 2021-10-14
Payer: MEDICARE

## 2021-10-14 VITALS
SYSTOLIC BLOOD PRESSURE: 152 MMHG | OXYGEN SATURATION: 100 % | WEIGHT: 117 LBS | TEMPERATURE: 97.6 F | DIASTOLIC BLOOD PRESSURE: 83 MMHG | BODY MASS INDEX: 20.73 KG/M2 | HEIGHT: 63 IN | HEART RATE: 83 BPM

## 2021-10-14 DIAGNOSIS — Z80.8 FAMILY HISTORY OF MALIGNANT NEOPLASM OF OTHER ORGANS OR SYSTEMS: ICD-10-CM

## 2021-10-14 PROCEDURE — 31575 DIAGNOSTIC LARYNGOSCOPY: CPT

## 2021-10-14 PROCEDURE — 99205 OFFICE O/P NEW HI 60 MIN: CPT | Mod: 25

## 2021-10-14 RX ORDER — HYDROXYUREA 200 MG/1
200 CAPSULE ORAL DAILY
Qty: 30 | Refills: 0 | Status: DISCONTINUED | COMMUNITY
Start: 2021-10-11 | End: 2021-10-14

## 2021-10-14 NOTE — PHYSICAL EXAM
[Heart Rate And Rhythm] : heart rate and rhythm were normal [Heart Sounds] : normal S1 and S2 [Normal] : oriented to person, place and time, the affect was normal, the mood was normal and not anxious

## 2021-10-15 NOTE — HISTORY OF PRESENT ILLNESS
[FreeTextEntry1] : Ms. Noemy Smith is a 76 year-old female, current smoker (2 ppd x > 50 years, now 1 ppd) with history of thromobcythemia (on hydroxyurea), HTN, CAD, COPD, DCIS (s/p left lumpectomy in 2016 and letrozole x 5 years), SCC in situ of the esophagus (s/p surgery in November 2020 Dr. Ortiz at [facility]), SCC of right tongue (s/p right partial glossectomy by Dr. Del Rosario on 12/2/2020) referred by Dr. 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  \par \par During a surveillance visit with Dr. Del Rosario on 8/12/21, she was found to have a new slightly raised ~1 cm patch of slightly raised, friable mucosa with an exudate in the mid posterior pharyngeal wall at the level of the tongue base concerning for a squamous cell carcinoma. She was referred to Dr. Mccarthy who performed resection of a posterior pharyngeal wall lesion on 9/2/21.  Pathology confirmed a 1.6 cm p16 negative squamous cell carcinoma, DOI 3 mm, positive margins, suspicious for lymphovascular invasion. \par \par Pharyngeal lesion excision 9/2/21\par - Moderately differentiated keratinizing squamous cell carcinoma, p16 negative.\par - Tumor spans 1.6 cm in this material, invasive to maximum depth of 3 mm.\par - Invasive carcinoma focally involves right radial margin (1C,1D), and is at 1 mm from suprior margin (1A).\par - Rest of mucosal margins, positive for high-grade dysplasia/CIS.\par - No lymphovascular and perineural invasion visualized.\par - Addendum: Additional review shows focus suspicious for lymphovascular invasion (1C).\par \par CT neck 9/30/21\par - Assessment of the pharynx there is limited by streak artifact, with a questionable both concerning nodular site of enhancement at the right paramedian posterior pharyngeal wall as above.\par - Correlate with direct inspection, and consider MRI for finer soft tissue contrast resolution and clarification of finding reported above. Otherwise, no mass lesion seen in the neck or lymphadenopathy.\par - Regarding brachial plexopathy, no obvious mass identified, however MRI of the brachial plexus may better exclude small mass lesion, especially if there is strong suspicion from a neurological perspective (has EMG been done?).\par \par She was referred to Dr. Soler for medical oncology.  PET/CT is pending - scheduled to be done 10/21 at [facility].  Scheduled to see dental on 10/28.  \par \par Today, she reports feeling tired, decreased appetite, 6 pound weight loss over the past year.  No dysphagia or odynophagia.  Eating a regular diet.  Chronic diarrhea related to gluten allergy.  \par \par She lives on [address] [facility] with her .  Runs a water purification company.  Current smoker - has been smoking since age 19, previously 2 ppd, now 1 ppd.  Currently seeing smoking cessation MD Maninder Martinez at [facility].  Drinks 3-4 drinks daily.  Denies h/o drug use.

## 2021-10-15 NOTE — PROCEDURE
[Lesion] : lesion identified by mirror examination needing further evaluation [Topical Lidocaine] : topical lidocaine [Flexible Endoscope] : examined with the flexible endoscope [Normal] : normal [de-identified] : barely visible healing raised surgical site of right posterior oropharyngeal wall, without ulceration. No obvious mass. Just to right of midline.

## 2021-10-19 ENCOUNTER — APPOINTMENT (OUTPATIENT)
Dept: OTOLARYNGOLOGY | Facility: CLINIC | Age: 76
End: 2021-10-19
Payer: MEDICARE

## 2021-10-19 DIAGNOSIS — R07.0 PAIN IN THROAT: ICD-10-CM

## 2021-10-19 DIAGNOSIS — R13.10 DYSPHAGIA, UNSPECIFIED: ICD-10-CM

## 2021-10-19 PROCEDURE — 31575 DIAGNOSTIC LARYNGOSCOPY: CPT | Mod: 58

## 2021-10-19 PROCEDURE — 99024 POSTOP FOLLOW-UP VISIT: CPT

## 2021-10-20 PROBLEM — R07.0 THROAT PAIN: Status: ACTIVE | Noted: 2021-10-20

## 2021-10-20 PROBLEM — R13.10 DYSPHAGIA: Status: ACTIVE | Noted: 2021-10-20

## 2021-10-21 ENCOUNTER — APPOINTMENT (OUTPATIENT)
Dept: HEART AND VASCULAR | Facility: CLINIC | Age: 76
End: 2021-10-21

## 2021-11-01 ENCOUNTER — APPOINTMENT (OUTPATIENT)
Dept: HEMATOLOGY ONCOLOGY | Facility: CLINIC | Age: 76
End: 2021-11-01

## 2021-11-01 NOTE — REVIEW OF SYSTEMS
[Fever] : no fever [Chills] : no chills [Night Sweats] : night sweats [Fatigue] : fatigue [Recent Change In Weight] : ~T recent weight change [Dysphagia] : no dysphagia [Odynophagia] : no odynophagia [Chest Pain] : no chest pain [Palpitations] : no palpitations [Leg Claudication] : no intermittent leg claudication [Lower Ext Edema] : no lower extremity edema [Shortness Of Breath] : no shortness of breath [Wheezing] : no wheezing [Cough] : cough [SOB on Exertion] : no shortness of breath during exertion [Abdominal Pain] : no abdominal pain [Vomiting] : no vomiting [Constipation] : no constipation [Diarrhea] : diarrhea [Negative] : Heme/Lymph [FreeTextEntry4] : diff swallowing pills only [de-identified] : peripheral neuropathy in feet

## 2021-11-01 NOTE — HISTORY OF PRESENT ILLNESS
[de-identified] : Ms. Smith is a 76 year old female, current smoker(2ppd x over 50 years now 1ppd)  with history of polycythemia( on hydroxyurea), HTN, CAD, COPD, breast cancer (s/p lumpectomy in 2016, completed letrozole for 5 years), Esophageal cancer (s/p surgery in 2020), SCC of right tongue(s/p Right lateral tongue partial glossectomy with Dr. Del Rosario on 12/2/2020) who presents to clinic today for evaluation of newly diagnosed Squamous cell carcinoma of the pharynx, referred by Dr. Johnson.  \par \par ONC Hx: \par Ms. Smith has been following with Dr. Del Rosario  for SCC of right tongue and underwent right lateral tongue partial glossectomy on 12/2/2020. Surgical pathology was negative for residual invasive carcinoma or high grade dysplasia. She is being evaluated for a low grade hemopoietic malignancy by Jose D Lunsford MD. She had a left breast lumpectomy for DCIS in the past [place] [facility] [place]  [place] nodule is being followed. 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 During recent visit with Dr. Del Rosario on 8/12/21, she was found to have a new slightly raised ~1 cm patch of slightly raised, friable mucosa with an exudate in the mid posterior pharyngeal wall at the level of the tongue base that is concerning for a squamous cell carcinoma. She was referred to Dr. Mccarthy who performed resection of a posterior pharyngeal wall lesion on 9/2/21, path revealed a 1.6cm invasive carcinoma with lymphovascular invasion and positive margins.  She reported 6 lbs weight loss after the surgery in 2020. \par \par FH: Father with colon cancer, Mother with metastatic breast cancer, cousin with metastatic breast cancer\par  \par 12/2/20\par Surgical Final Report\par Final Diagnosis\par 1. Right oral lateral tongue; re-excision:\par - Portion of tongue with focal scar and prior biopsy site changes, negative for residual invasive carcinoma or high-grade dysplasia (see note).\par - Rest of lingual mucosa and striated muscle, unremarkable.\par 2. Right inferior tongue margin; excision:\par - Squamous mucosa and striated muscle, unremarkable.\par 3. Right anterior tongue margin; excision:\par - Squamous mucosa and scant underlying striated muscle, unremarkable.\par 4. Right posterior tongue margin; excision:\par - Squamous mucosa and underlying striated muscle, unremarkable.\par 5. Right deep inferior tongue margin; excision:\par - Striated muscle and adipose tissue, unremarkable.\par 6. Right deep superior tongue margin; excision:\par - Striated muscle and adipose tissue, unremarkable.\par 7. Right deep central tongue margin; excision:\par - Striated muscle and adipose tissue with scattered nerve twigs, unremarkable.\par \par Note: Serial sections were examined (1A-1D, 3A). Recent 'right mid lateral tongue biopsy' ([id number]; 11/5/2020 ) is reviewed, and shows a 0.35 cm moderately differentiated squamous\par cell carcinoma, invasive to maximum depth of 2 mm.\par In view of previous findings, Pathologic Stage for this case is: Pathologic Stage (pTNM):pT1, pNX.\par \par 9/2/21\par Surgical Pathology Report \par Pharyngeal lesion; excision:\par - Moderately differentiated keratinizing squamous cell carcinoma, p16 negative.\par - Tumor spans 1.6 cm in this material, invasive to maximum depth of 3 mm.\par - Invasive carcinoma focally involves right radial margin (1C,1D), and is at 1 mm from suprior margin (1A).\par - Rest of mucosal margins, positive for high-grade dysplasia/CIS.\par - No lymphovascular and perineural invasion visualized.\par Note: Immunostains for pankeratin, Ki-67 and p16 support diagnosis.\par Addendum\par Additional review shows focus suspicious for lymphovascular invasion (1C).\par \par 9/30/21 CT Neck\par 1.Assessment of the pharynx there is limited by streak artifact, with a questionable both concerning nodular site of enhancement at the right paramedian posterior pharyngeal wall as above.\par 2.Correlate with direct inspection, and consider MRI for finer soft tissue contrast resolution and clarification of finding reported above. Otherwise, no mass lesion seen in the neck or lymphadenopathy.\par 3.Regarding brachial plexopathy, no obvious mass identified, however MRI of the brachial plexus may better exclude small mass lesion, especially if there is strong suspicion from a neurological perspective (has EMG been done?). [de-identified] : Patient presents to clinic  with her friend for initial visit. Reports mild sensation in her right side of neck when swallowing   A few pounds weight loss since the surgery in 2020. Appetite is fine. Reports chronic peripheral neuropathy in feet and dry cough in the morning. Denies SOB, chest pain, fever, nausea, vomiting, constipation, abdominal pain, and dysphagia.

## 2021-11-01 NOTE — REVIEW OF SYSTEMS
[Fever] : no fever [Chills] : no chills [Night Sweats] : night sweats [Fatigue] : fatigue [Recent Change In Weight] : ~T recent weight change [Dysphagia] : no dysphagia [Odynophagia] : no odynophagia [Chest Pain] : no chest pain [Palpitations] : no palpitations [Leg Claudication] : no intermittent leg claudication [Lower Ext Edema] : no lower extremity edema [Shortness Of Breath] : no shortness of breath [Wheezing] : no wheezing [Cough] : cough [SOB on Exertion] : no shortness of breath during exertion [Abdominal Pain] : no abdominal pain [Vomiting] : no vomiting [Constipation] : no constipation [Diarrhea] : diarrhea [Negative] : Heme/Lymph [FreeTextEntry4] : diff swallowing pills only [de-identified] : peripheral neuropathy in feet

## 2021-11-01 NOTE — ASSESSMENT
[FreeTextEntry1] : Ms. Smith is a 76 year old female, current smoker (2ppd x over 50 years now 1ppd) with history of polycythemia( on hydroxyurea 300mg), HTN, CAD, COPD, breast cancer (s/p lumpectomy in 2016,completed letrozole for 5 years), Esophageal cancer (s/p surgery in 2020), SCC of right tongue(s/p Right lateral tongue partial glossectomy with Dr. Del Rosario on 12/2/2020) who presents to clinic today for evaluation of newly diagnosed Squamous cell carcinoma of the pharynx, referred by Dr. Johnson.  \par \par Plan\par #Squamous cell carcinoma of the pharynx\par -Labs, pathology and scans reviewed with patient.  \par -Received covid vaccine x2, flu shot\par -Her case was discussed at [facility] Board.  Scheduled to see Dr. Narayanan on 10/14. Will obtain PET/CT to see if any metastasis. Pending further management at this time.\par -Nutrition f/u as needed\par \par RTC in 3 weeks after PET/CT

## 2021-11-01 NOTE — REVIEW OF SYSTEMS
[Fever] : no fever [Chills] : no chills [Night Sweats] : night sweats [Fatigue] : fatigue [Recent Change In Weight] : ~T recent weight change [Dysphagia] : no dysphagia [Odynophagia] : no odynophagia [Chest Pain] : no chest pain [Palpitations] : no palpitations [Leg Claudication] : no intermittent leg claudication [Lower Ext Edema] : no lower extremity edema [Shortness Of Breath] : no shortness of breath [Wheezing] : no wheezing [Cough] : cough [SOB on Exertion] : no shortness of breath during exertion [Abdominal Pain] : no abdominal pain [Vomiting] : no vomiting [Constipation] : no constipation [Diarrhea] : diarrhea [Negative] : Heme/Lymph [FreeTextEntry4] : diff swallowing pills only [de-identified] : peripheral neuropathy in feet

## 2021-11-01 NOTE — HISTORY OF PRESENT ILLNESS
[de-identified] : Ms. Smith is a 76 year old female, current smoker(2ppd x over 50 years now 1ppd)  with history of polycythemia( on hydroxyurea), HTN, CAD, COPD, breast cancer (s/p lumpectomy in 2016, completed letrozole for 5 years), Esophageal cancer (s/p surgery in 2020), SCC of right tongue(s/p Right lateral tongue partial glossectomy with Dr. Del Rosario on 12/2/2020) who presents to clinic today for evaluation of newly diagnosed Squamous cell carcinoma of the pharynx, referred by Dr. Johnson.  \par \par ONC Hx: \par Ms. Smith has been following with Dr. Del Rosario  for SCC of right tongue and underwent right lateral tongue partial glossectomy on 12/2/2020. Surgical pathology was negative for residual invasive carcinoma or high grade dysplasia. She is being evaluated for a low grade hemopoietic malignancy by Jose D Lunsford MD. 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 [place].  A pulmonary nodule is being followed. 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 During recent visit with Dr. Del Rosario on 8/12/21, she was found to have a new slightly raised ~1 cm patch of slightly raised, friable mucosa with an exudate in the mid posterior pharyngeal wall at the level of the tongue base that is concerning for a squamous cell carcinoma. She was referred to Dr. Mccarthy who performed resection of a posterior pharyngeal wall lesion on 9/2/21, path revealed a 1.6cm invasive carcinoma with lymphovascular invasion and positive margins.  She reported 6 lbs weight loss after the surgery in 2020. \par \par FH: Father with colon cancer, Mother with metastatic breast cancer, cousin with metastatic breast cancer\par  \par 12/2/20\par Surgical Final Report\par Final Diagnosis\par 1. Right oral lateral tongue; re-excision:\par - Portion of tongue with focal scar and prior biopsy site changes, negative for residual invasive carcinoma or high-grade dysplasia (see note).\par - Rest of lingual mucosa and striated muscle, unremarkable.\par 2. Right inferior tongue margin; excision:\par - Squamous mucosa and striated muscle, unremarkable.\par 3. Right anterior tongue margin; excision:\par - Squamous mucosa and scant underlying striated muscle, unremarkable.\par 4. Right posterior tongue margin; excision:\par - Squamous mucosa and underlying striated muscle, unremarkable.\par 5. Right deep inferior tongue margin; excision:\par - Striated muscle and adipose tissue, unremarkable.\par 6. Right deep superior tongue margin; excision:\par - Striated muscle and adipose tissue, unremarkable.\par 7. Right deep central tongue margin; excision:\par - Striated muscle and adipose tissue with scattered nerve twigs, unremarkable.\par \par Note: Serial sections were examined (1A-1D, 3A). Recent 'right mid lateral tongue biopsy' ([id number]; 11/5/2020 ) is reviewed, and shows a 0.35 cm moderately differentiated squamous\par cell carcinoma, invasive to maximum depth of 2 mm.\par In view of previous findings, Pathologic Stage for this case is: Pathologic Stage (pTNM):pT1, pNX.\par \par 9/2/21\par Surgical Pathology Report \par Pharyngeal lesion; excision:\par - Moderately differentiated keratinizing squamous cell carcinoma, p16 negative.\par - Tumor spans 1.6 cm in this material, invasive to maximum depth of 3 mm.\par - Invasive carcinoma focally involves right radial margin (1C,1D), and is at 1 mm from suprior margin (1A).\par - Rest of mucosal margins, positive for high-grade dysplasia/CIS.\par - No lymphovascular and perineural invasion visualized.\par Note: Immunostains for pankeratin, Ki-67 and p16 support diagnosis.\par Addendum\par Additional review shows focus suspicious for lymphovascular invasion (1C).\par \par 9/30/21 CT Neck\par 1.Assessment of the pharynx there is limited by streak artifact, with a questionable both concerning nodular site of enhancement at the right paramedian posterior pharyngeal wall as above.\par 2.Correlate with direct inspection, and consider MRI for finer soft tissue contrast resolution and clarification of finding reported above. Otherwise, no mass lesion seen in the neck or lymphadenopathy.\par 3.Regarding brachial plexopathy, no obvious mass identified, however MRI of the brachial plexus may better exclude small mass lesion, especially if there is strong suspicion from a neurological perspective (has EMG been done?). [de-identified] : Patient presents to clinic  with her friend for initial visit. Reports mild sensation in her right side of neck when swallowing   A few pounds weight loss since the surgery in 2020. Appetite is fine. Reports chronic peripheral neuropathy in feet and dry cough in the morning. Denies SOB, chest pain, fever, nausea, vomiting, constipation, abdominal pain, and dysphagia.

## 2021-11-01 NOTE — HISTORY OF PRESENT ILLNESS
[de-identified] : Ms. Smith is a 76 year old female, current smoker(2ppd x over 50 years now 1ppd)  with history of polycythemia( on hydroxyurea), HTN, CAD, COPD, breast cancer (s/p lumpectomy in 2016, completed letrozole for 5 years), Esophageal cancer (s/p surgery in 2020), SCC of right tongue(s/p Right lateral tongue partial glossectomy with Dr. Del Rosario on 12/2/2020) who presents to clinic today for evaluation of newly diagnosed Squamous cell carcinoma of the pharynx, referred by Dr. Johnson.  \par \par ONC Hx: \par Ms. Smith has been following with Dr. Del Rosario  for SCC of right tongue and underwent right lateral tongue partial glossectomy on 12/2/2020. Surgical pathology was negative for residual invasive carcinoma or high grade dysplasia. She is being evaluated for a low grade hemopoietic malignancy by Jose D Lunsford MD. She had a left breast lumpectomy for DCIS in the past and was followed at [facility] [place].  A pulmonary nodule is being followed. 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 During recent visit with Dr. Del Rosario on 8/12/21, she was found to have a new slightly raised ~1 cm patch of slightly raised, friable mucosa with an exudate in the mid posterior pharyngeal wall at the level of the tongue base that is concerning for a squamous cell carcinoma. She was referred to Dr. Mccarthy who performed resection of a posterior pharyngeal wall lesion on 9/2/21, path revealed a 1.6cm invasive carcinoma with lymphovascular invasion and positive margins.  She reported 6 lbs weight loss after the surgery in 2020. \par \par FH: Father with colon cancer, Mother with metastatic breast cancer, cousin with metastatic breast cancer\par  \par 12/2/20\par Surgical Final Report\par Final Diagnosis\par 1. Right oral lateral tongue; re-excision:\par - Portion of tongue with focal scar and prior biopsy site changes, negative for residual invasive carcinoma or high-grade dysplasia (see note).\par - Rest of lingual mucosa and striated muscle, unremarkable.\par 2. Right inferior tongue margin; excision:\par - Squamous mucosa and striated muscle, unremarkable.\par 3. Right anterior tongue margin; excision:\par - Squamous mucosa and scant underlying striated muscle, unremarkable.\par 4. Right posterior tongue margin; excision:\par - Squamous mucosa and underlying striated muscle, unremarkable.\par 5. Right deep inferior tongue margin; excision:\par - Striated muscle and adipose tissue, unremarkable.\par 6. Right deep superior tongue margin; excision:\par - Striated muscle and adipose tissue, unremarkable.\par 7. Right deep central tongue margin; excision:\par - Striated muscle and adipose tissue with scattered nerve twigs, unremarkable.\par \par Note: Serial sections were examined (1A-1D, 3A). Recent 'right mid lateral tongue biopsy' ([id number]; 11/5/2020 ) is reviewed, and shows a 0.35 cm moderately differentiated squamous\par cell carcinoma, invasive to maximum depth of 2 mm.\par In view of previous findings, Pathologic Stage for this case is: Pathologic Stage (pTNM):pT1, pNX.\par \par 9/2/21\par Surgical Pathology Report \par Pharyngeal lesion; excision:\par - Moderately differentiated keratinizing squamous cell carcinoma, p16 negative.\par - Tumor spans 1.6 cm in this material, invasive to maximum depth of 3 mm.\par - Invasive carcinoma focally involves right radial margin (1C,1D), and is at 1 mm from suprior margin (1A).\par - Rest of mucosal margins, positive for high-grade dysplasia/CIS.\par - No lymphovascular and perineural invasion visualized.\par Note: Immunostains for pankeratin, Ki-67 and p16 support diagnosis.\par Addendum\par Additional review shows focus suspicious for lymphovascular invasion (1C).\par \par 9/30/21 CT Neck\par 1.Assessment of the pharynx there is limited by streak artifact, with a questionable both concerning nodular site of enhancement at the right paramedian posterior pharyngeal wall as above.\par 2.Correlate with direct inspection, and consider MRI for finer soft tissue contrast resolution and clarification of finding reported above. Otherwise, no mass lesion seen in the neck or lymphadenopathy.\par 3.Regarding brachial plexopathy, no obvious mass identified, however MRI of the brachial plexus may better exclude small mass lesion, especially if there is strong suspicion from a neurological perspective (has EMG been done?). [de-identified] : Patient presents to clinic  with her friend for initial visit. Reports mild sensation in her right side of neck when swallowing   A few pounds weight loss since the surgery in 2020. Appetite is fine. Reports chronic peripheral neuropathy in feet and dry cough in the morning. Denies SOB, chest pain, fever, nausea, vomiting, constipation, abdominal pain, and dysphagia.

## 2021-11-09 ENCOUNTER — APPOINTMENT (OUTPATIENT)
Dept: OTOLARYNGOLOGY | Facility: CLINIC | Age: 76
End: 2021-11-09

## 2021-11-11 ENCOUNTER — APPOINTMENT (OUTPATIENT)
Dept: OTOLARYNGOLOGY | Facility: CLINIC | Age: 76
End: 2021-11-11

## 2021-12-07 ENCOUNTER — LABORATORY RESULT (OUTPATIENT)
Age: 76
End: 2021-12-07

## 2021-12-07 ENCOUNTER — APPOINTMENT (OUTPATIENT)
Dept: HEART AND VASCULAR | Facility: CLINIC | Age: 76
End: 2021-12-07
Payer: MEDICARE

## 2021-12-07 VITALS
TEMPERATURE: 98 F | SYSTOLIC BLOOD PRESSURE: 138 MMHG | WEIGHT: 111 LBS | HEIGHT: 63 IN | BODY MASS INDEX: 19.67 KG/M2 | OXYGEN SATURATION: 97 % | HEART RATE: 102 BPM | DIASTOLIC BLOOD PRESSURE: 78 MMHG

## 2021-12-07 DIAGNOSIS — D75.839 THROMBOCYTOSIS, UNSPECIFIED: ICD-10-CM

## 2021-12-07 DIAGNOSIS — K14.8 OTHER DISEASES OF TONGUE: ICD-10-CM

## 2021-12-07 DIAGNOSIS — R06.00 DYSPNEA, UNSPECIFIED: ICD-10-CM

## 2021-12-07 DIAGNOSIS — E83.42 HYPOMAGNESEMIA: ICD-10-CM

## 2021-12-07 PROCEDURE — 93000 ELECTROCARDIOGRAM COMPLETE: CPT

## 2021-12-07 PROCEDURE — 99213 OFFICE O/P EST LOW 20 MIN: CPT

## 2021-12-07 PROCEDURE — 36415 COLL VENOUS BLD VENIPUNCTURE: CPT

## 2021-12-09 LAB
ALBUMIN SERPL ELPH-MCNC: 4.1 G/DL
ALP BLD-CCNC: 84 U/L
ALT SERPL-CCNC: 7 U/L
ANION GAP SERPL CALC-SCNC: 12 MMOL/L
APPEARANCE: ABNORMAL
AST SERPL-CCNC: 21 U/L
BACTERIA: NEGATIVE
BASOPHILS # BLD AUTO: 0.31 K/UL
BASOPHILS NFR BLD AUTO: 1.7 %
BILIRUB SERPL-MCNC: 0.4 MG/DL
BILIRUBIN URINE: NEGATIVE
BLOOD URINE: NEGATIVE
BUN SERPL-MCNC: 10 MG/DL
CALCIUM SERPL-MCNC: 10.4 MG/DL
CHLORIDE SERPL-SCNC: 101 MMOL/L
CO2 SERPL-SCNC: 26 MMOL/L
COLOR: YELLOW
COVID-19 NUCLEOCAPSID  GAM ANTIBODY INTERPRETATION: NEGATIVE
COVID-19 SPIKE DOMAIN ANTIBODY INTERPRETATION: POSITIVE
CREAT SERPL-MCNC: 0.79 MG/DL
EOSINOPHIL # BLD AUTO: 0.17 K/UL
EOSINOPHIL NFR BLD AUTO: 0.9 %
FOLATE SERPL-MCNC: 13 NG/ML
GLUCOSE QUALITATIVE U: NEGATIVE
GLUCOSE SERPL-MCNC: 111 MG/DL
HCT VFR BLD CALC: 35.9 %
HGB BLD-MCNC: 11.3 G/DL
HYALINE CASTS: 0 /LPF
KETONES URINE: NEGATIVE
LEUKOCYTE ESTERASE URINE: ABNORMAL
LYMPHOCYTES # BLD AUTO: 1.96 K/UL
LYMPHOCYTES NFR BLD AUTO: 10.6 %
MAGNESIUM SERPL-MCNC: 1.6 MG/DL
MAN DIFF?: NORMAL
MCHC RBC-ENTMCNC: 31.5 GM/DL
MCHC RBC-ENTMCNC: 34.2 PG
MCV RBC AUTO: 108.8 FL
MICROSCOPIC-UA: NORMAL
MONOCYTES # BLD AUTO: 2.46 K/UL
MONOCYTES NFR BLD AUTO: 13.3 %
NEUTROPHILS # BLD AUTO: 13.26 K/UL
NEUTROPHILS NFR BLD AUTO: 71.7 %
NITRITE URINE: NEGATIVE
PH URINE: 5
PLATELET # BLD AUTO: 603 K/UL
POTASSIUM SERPL-SCNC: 5.2 MMOL/L
PROT SERPL-MCNC: 6.9 G/DL
PROTEIN URINE: NORMAL
RBC # BLD: 3.3 M/UL
RBC # FLD: 17.9 %
RED BLOOD CELLS URINE: 4 /HPF
SARS-COV-2 AB SERPL IA-ACNC: >250 U/ML
SARS-COV-2 AB SERPL QL IA: 0.1 INDEX
SODIUM SERPL-SCNC: 139 MMOL/L
SPECIFIC GRAVITY URINE: 1.02
SQUAMOUS EPITHELIAL CELLS: 26 /HPF
UROBILINOGEN URINE: NORMAL
VIT B12 SERPL-MCNC: >2000 PG/ML
WBC # FLD AUTO: 18.49 K/UL
WHITE BLOOD CELLS URINE: 10 /HPF

## 2021-12-23 NOTE — REASON FOR VISIT
[Hyperlipidemia] : hyperlipidemia [Spouse] : spouse [FreeTextEntry1] : 76  year old chronic heavy smoker  with hx of  DCIS of left breast  and  CAD  has hx of statin induced transaminitis . S/P resection of right sided tongue squamous cell carcinoma in December 2020 , she was diagnosed with  moderately differentiated invasive squamous  carcinoma 1.6cm  in right pharyngeal area  and recurrence post resection. 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 Her platelet counts are now normalized at 368 K

## 2021-12-23 NOTE — ASSESSMENT
[FreeTextEntry1] : recurrent  squamous cell tongue cancer \par \par hemorrhagic thrombocythemia /JAK2 + \par \par hx of low folate

## 2021-12-23 NOTE — PHYSICAL EXAM
[Well Developed] : well developed [Well Nourished] : well nourished [No Acute Distress] : no acute distress [Normal Conjunctiva] : normal conjunctiva [No Xanthelasma] : no xanthelasma [Normal Venous Pressure] : normal venous pressure [No Carotid Bruit] : no carotid bruit [Normal S1, S2] : normal S1, S2 [No Murmur] : no murmur [No Rub] : no rub [No Gallop] : no gallop [Clear Lung Fields] : clear lung fields [Good Air Entry] : good air entry [No Respiratory Distress] : no respiratory distress  [Soft] : abdomen soft [Non Tender] : non-tender [No Masses/organomegaly] : no masses/organomegaly [Normal Bowel Sounds] : normal bowel sounds [Normal Gait] : normal gait [Gait - Sufficient for Exercise Testing] : gait - sufficient for exercise testing [No Cyanosis] : no cyanosis [No Clubbing] : no clubbing [No Varicosities] : no varicosities [Edema ___] : edema [unfilled] [No Rash] : no rash [No Skin Lesions] : no skin lesions [Moves all extremities] : moves all extremities [No Focal Deficits] : no focal deficits [Alert and Oriented] : alert and oriented [Normal memory] : normal memory [de-identified] : anicteric  [de-identified] : chronic hoarse voice

## 2021-12-23 NOTE — DISCUSSION/SUMMARY
[FreeTextEntry1] : counseled regarding dietary intake  and protein supplementation \par \par venipuncture performed  \par \par Increase hydration\par \par

## 2021-12-23 NOTE — HISTORY OF PRESENT ILLNESS
[FreeTextEntry1] : No personal hx of covid, she has been fully vaccinated  and also received her flu vaccine \par \par She has been receiving radiation treatment for  recurrent  tongue cancer \par \par Hx of hypomagnesemia \par \par She is not eating enough as per her  \par \par EKG shows NSR 97 bpm   without ectopy, ischemia or LVH

## 2021-12-23 NOTE — REVIEW OF SYSTEMS
[Feeling Fatigued] : feeling fatigued [Weight Loss (___ Lbs)] : [unfilled] ~Ulb weight loss [Sore Throat] : sore throat [Anxiety] : anxiety [Under Stress] : under stress [Negative] : Heme/Lymph

## 2022-01-24 ENCOUNTER — RX RENEWAL (OUTPATIENT)
Age: 77
End: 2022-01-24

## 2022-04-04 NOTE — ED PROVIDER NOTE - PHYSICAL EXAMINATION
CONSTITUTIONAL: Well-appearing; slightly stressed; in no apparent distress.   HEAD: Normocephalic; atraumatic.   EYES:  conjunctiva and sclera clear  ENT: normal nose; no rhinorrhea; normal pharynx with no erythema or lesions.   NECK: Supple; non-tender;   CARDIOVASCULAR: Normal S1, S2; no murmurs, rubs, or gallops. Regular rate and rhythm.   RESPIRATORY: Breathing easily; breath sounds clear and equal bilaterally; no wheezes, rhonchi, or rales.  GI: Soft; non-distended; non-tender; no palpable organomegaly.   EXT: No cyanosis or edema; N/V intact  SKIN: Normal for age and race; warm; dry; good turgor; no apparent lesions or rash.   NEURO: A & O x 3; face symmetric; grossly unremarkable.   PSYCHOLOGICAL: The patient’s mood and manner are appropriate. posterior cervical L/posterior cervical R/anterior cervical L/anterior cervical R

## 2022-04-18 ENCOUNTER — APPOINTMENT (OUTPATIENT)
Dept: HEART AND VASCULAR | Facility: CLINIC | Age: 77
End: 2022-04-18
Payer: MEDICARE

## 2022-04-18 VITALS
SYSTOLIC BLOOD PRESSURE: 120 MMHG | WEIGHT: 103 LBS | DIASTOLIC BLOOD PRESSURE: 80 MMHG | BODY MASS INDEX: 18.25 KG/M2 | OXYGEN SATURATION: 97 % | HEIGHT: 63 IN | HEART RATE: 77 BPM

## 2022-04-18 DIAGNOSIS — R59.1 GENERALIZED ENLARGED LYMPH NODES: ICD-10-CM

## 2022-04-18 DIAGNOSIS — E87.1 HYPO-OSMOLALITY AND HYPONATREMIA: ICD-10-CM

## 2022-04-18 DIAGNOSIS — Z15.89 GENETIC SUSCEPTIBILITY TO OTHER DISEASE: ICD-10-CM

## 2022-04-18 PROCEDURE — 99213 OFFICE O/P EST LOW 20 MIN: CPT

## 2022-04-18 RX ORDER — TRIAMCINOLONE ACETONIDE 1 MG/G
0.1 CREAM TOPICAL
Qty: 454 | Refills: 0 | Status: ACTIVE | COMMUNITY
Start: 2022-01-10

## 2022-04-18 RX ORDER — SODIUM FLUORIDE 1.1 G/100G
1.1 GEL ORAL
Qty: 56 | Refills: 0 | Status: ACTIVE | COMMUNITY
Start: 2022-02-15

## 2022-04-18 RX ORDER — OXYCODONE 5 MG/1
5 TABLET ORAL
Qty: 180 | Refills: 0 | Status: ACTIVE | COMMUNITY
Start: 2022-01-13

## 2022-04-18 RX ORDER — HYDROXYUREA 300 MG/1
300 CAPSULE ORAL
Qty: 30 | Refills: 0 | Status: ACTIVE | COMMUNITY
Start: 2021-09-14

## 2022-04-18 RX ORDER — NORMAL SALT TABLETS 1 G/G
1 TABLET ORAL
Qty: 20 | Refills: 0 | Status: ACTIVE | COMMUNITY
Start: 2022-04-04

## 2022-04-18 RX ORDER — SILVER SULFADIAZINE 10 MG/G
1 CREAM TOPICAL
Qty: 50 | Refills: 0 | Status: ACTIVE | COMMUNITY
Start: 2022-01-10

## 2022-04-18 RX ORDER — LIDOCAINE HYDROCHLORIDE 20 MG/ML
2 SOLUTION ORAL; TOPICAL
Qty: 600 | Refills: 0 | Status: ACTIVE | COMMUNITY
Start: 2021-12-20

## 2022-04-18 NOTE — DISCUSSION/SUMMARY
Add Klor 10mEq daily. Repeat BMP next week.   [With Me] : with me [FreeTextEntry1] : awaiting PET scan repeat April 28th \par \par follow up with hematology\par \par will await results of follow up PET\par \par |Encouraged to drink as much BOOST as possible to supplement diet/calorie intake

## 2022-04-18 NOTE — ASSESSMENT
[FreeTextEntry1] : tongue cancer \par \par \par esophageal cancer hx \par \par \par new , enlarging cervical  and submaxillary lymph nodes \par \par Mild hyponatremia \par \par SARIAH 2 + dyscrasia \par

## 2022-04-18 NOTE — PHYSICAL EXAM
[Well Developed] : well developed [Well Nourished] : well nourished [No Acute Distress] : no acute distress [Normal Conjunctiva] : normal conjunctiva [No Xanthelasma] : no xanthelasma [Normal Venous Pressure] : normal venous pressure [No Carotid Bruit] : no carotid bruit [Normal S1, S2] : normal S1, S2 [No Murmur] : no murmur [No Rub] : no rub [No Gallop] : no gallop [Clear Lung Fields] : clear lung fields [Good Air Entry] : good air entry [No Respiratory Distress] : no respiratory distress  [Soft] : abdomen soft [Non Tender] : non-tender [No Masses/organomegaly] : no masses/organomegaly [Normal Bowel Sounds] : normal bowel sounds [Normal Gait] : normal gait [Gait - Sufficient for Exercise Testing] : gait - sufficient for exercise testing [No Cyanosis] : no cyanosis [No Clubbing] : no clubbing [No Varicosities] : no varicosities [Edema ___] : edema [unfilled] [No Rash] : no rash [No Skin Lesions] : no skin lesions [Moves all extremities] : moves all extremities [No Focal Deficits] : no focal deficits [Alert and Oriented] : alert and oriented [Normal memory] : normal memory [de-identified] : anicteric  [de-identified] : chronic hoarse voice

## 2022-04-18 NOTE — REASON FOR VISIT
[Symptom and Test Evaluation] : symptom and test evaluation [Hyperlipidemia] : hyperlipidemia [FreeTextEntry1] : 76  year old chronic heavy smoker  with hx of  DCIS of left breast  and  CAD  has hx of statin induced transaminitis . S/P resection of right sided tongue squamous cell carcinoma in December 2020 , she was diagnosed with  moderately differentiated invasive squamous  carcinoma 1.6cm  in right pharyngeal area  and recurrence post resection. 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 Her platelet counts are now normalized at 368 K

## 2022-04-18 NOTE — HISTORY OF PRESENT ILLNESS
[FreeTextEntry1] : Completed head and neck radiation January 2022\par \par 3 weeks ago noted cervical enlargement ,  mildly tender on palpitations \par \par no fevers, no chills, no productive cough\par \par s/p covid booster in November 2021\par \par + postnasal drip is chronic \par \par some difficulty swallowing\par \par No hemoptysis \par \par Last sodium was 130\par \par No bleeding, bruising or clotting issues \par \par Now on 300mg daily Hydrea

## 2022-04-18 NOTE — REVIEW OF SYSTEMS
[Feeling Fatigued] : feeling fatigued [Weight Loss (___ Lbs)] : [unfilled] ~Ulb weight loss [Sore Throat] : sore throat [Anxiety] : anxiety [Under Stress] : under stress [Negative] : Neurological [FreeTextEntry4] : adenopathy neck

## 2022-07-01 ENCOUNTER — APPOINTMENT (OUTPATIENT)
Dept: OTOLARYNGOLOGY | Facility: CLINIC | Age: 77
End: 2022-07-01

## 2022-07-01 VITALS
SYSTOLIC BLOOD PRESSURE: 117 MMHG | HEIGHT: 63 IN | DIASTOLIC BLOOD PRESSURE: 75 MMHG | RESPIRATION RATE: 16 BRPM | WEIGHT: 98 LBS | OXYGEN SATURATION: 97 % | TEMPERATURE: 98 F | BODY MASS INDEX: 17.36 KG/M2 | HEART RATE: 95 BPM

## 2022-07-01 DIAGNOSIS — C02.9 MALIGNANT NEOPLASM OF TONGUE, UNSPECIFIED: ICD-10-CM

## 2022-07-01 DIAGNOSIS — R49.0 DYSPHONIA: ICD-10-CM

## 2022-07-01 PROCEDURE — 31575 DIAGNOSTIC LARYNGOSCOPY: CPT

## 2022-07-01 PROCEDURE — 99215 OFFICE O/P EST HI 40 MIN: CPT | Mod: 25

## 2022-07-01 RX ORDER — CHLORHEXIDINE GLUCONATE 4 %
400 (240 MG) LIQUID (ML) TOPICAL
Qty: 30 | Refills: 0 | Status: ACTIVE | COMMUNITY
Start: 2021-10-11

## 2022-07-01 NOTE — PROCEDURE
[Image(s) Captured] : image(s) captured and filed [Unable to Cooperate with Mirror] : patient unable to cooperate with mirror [Gag Reflex] : gag reflex preventing mirror examination [Hoarseness] : hoarseness not clearly evaluated by indirect laryngoscopy [Complicated Symptoms] : complicated symptoms requiring more thorough examination than provided by mirror [Topical Lidocaine] : topical lidocaine [Oxymetazoline HCl] : oxymetazoline HCl [Flexible Endoscope] : examined with the flexible endoscope [Serial Number: ___] : Serial Number: [unfilled] [de-identified] : The nasal septum is minimally deviated to the right anteriorly. There are no masses or polyps and the nasal mucosa and secretions are normal. The choanae and posterior nasopharynx are normal without masses or drainage. The Eustachian tube orifices appear patent. There is no longer an erythematous patch of  mucosa in the posterior pharyngeal wall, the vallecula and base of tongue are normal without ulcerations, lesions or masses. The hypopharynx including the pyriform sinuses open well without pooling of secretions, mucosal lesions or masses. The supraglottic larynx including the epiglottis, petiole, arytenoids, glossoepiglottic, aryepiglottic and pharyngoepiglottic folds are normal without mucosal lesions, ulcerations or masses. The glottis reveals normal false vocal folds. The true vocal folds are thickened and with bilateral edema. There are stable floppy, sessile edematous polyps without erythroplasia or leukoplakia. The posterior cricoid area has healthy pink mucosa in the interarytenoid area and esophageal inlet. There is moderate thickening/edema of the interarytenoid mucosa suggestive of posterior laryngitis from laryngopharyngeal acid reflux disease. The trachea is clear without narrowing in the immediate subglottic region, without deviation or lesions. \par  [de-identified] : tongue cancer, esophageal cancer, pharyngeal wall sq cell ca.

## 2022-07-01 NOTE — HISTORY OF PRESENT ILLNESS
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  She has been evaluated for IBS. 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 She was noted to have a depressed lesion in the right lateral posterior tongue noted by her gastroenterologist, Dr. De Los Santos.  She reports pain when chewing after an upper right molar tooth this past summer with continued irritation. She has no bleeding or dysphagia/dysarthria. She has chronic hoarseness and not able to quit smoking. She may have bitten down on the tongue after the extraction but she is not certain. She has non-suspicious pulmonary nodules and a PET/CT done on 9/17/2020 did not reveal any hypermetabolic activity in the neck. She also has moderate sleep apnea and uses a CPAP machine intermittently.  \par  [FreeTextEntry1] : Noemy returns for a follow up visit after WLE right tongue scca (re-excision after punch biopsy) on 12/2/2020. Surgical pathology was negative for residual invasive carcinoma or high grade dysplasia.  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 This mutation may explain her numerous malignancies including tongue squamous cell cancer, intraductal breast cancer, carcinoma in situ of the esophagus, a stable lung nodule.  Her tongue has healed well and she denies ulcerations, bleeding or new mass. She had FDG-PET/CT at [facility] on 05/06/2021.  She reports that it was negative for malignancy.  She had a follow-up esophagoscopy which was JULIAN.  However, she has been intolerant with the Hydrea treatment because of diarrhea and now needs to see another hematologist at [facility] for alternate treatment.  Since her last visit she has been back to her dentist and the sharp tooth that was rubbing against her tongue has since been polished down and smooth.  The tongue biopsy site had healed completely. However, she developed a new slightly raised  ~1 cm patch of slightly raised, friable mucosa with an exudate in the mid posterior pharyngeal wall at the level of the tongue base that was proven as a new squamous cell carcinoma.  She was presented at our H & N tumor board and consensus was to proceed with EBRT.  She completed radiation in January at [facility].  A follow-up PET CT at [facility] was done in April.  And she will have another in July.  The April PET/CT showed resolved posterior pharyngeal wall nodular lesion and decreased conspicuity of subjacent retropharyngeal/prevertebral lymph nodes.  There is no evidence for persistent or recurrent tumor.  She denies fever, body aches, cough, cyanosis, chest burning, anosmia or recent known COVID exposures.  All family members at home are well. She has been vaccinated (Pfizer) and boosted. She is still smoking despite all admonishing. She is under a great deal of stress as her  was recently diagnosed with Lewey body dementia. She is managing now without help.

## 2022-07-01 NOTE — REASON FOR VISIT
[FreeTextEntry2] : a followup visit after WLE of right tongue scca in December 2020. [FreeTextEntry1] : Kira Stanley MD PCP and referrer, New Heme/Onc is Maru Thornton MD [phone]

## 2022-07-01 NOTE — CONSULT LETTER
[Dear  ___] : Dear  [unfilled], [Consult Letter:] : I had the pleasure of evaluating your patient, [unfilled]. [Please see my note below.] : Please see my note below. [Consult Closing:] : Thank you very much for allowing me to participate in the care of this patient.  If you have any questions, please do not hesitate to contact me. [Sincerely,] : Sincerely, [DrRosalie  ___] : Dr. TILLMAN [DrRosalie ___] : Dr. TILLMAN [FreeTextEntry3] : \par Rahul Del Rosario M.D., FACS, ECNU\par Director Center for Thyroid & Parathyroid Surgery\par [facility]\par Certified in Thyroid/Parathyroid/Neck Ultrasound, ECNU/ AIUM\par \par , Department of Otolaryngology\par [facility] [employer] at [facility]\par

## 2022-08-11 NOTE — ED ADULT NURSE NOTE - CHPI ED NUR SYMPTOMS NEG
Advised patient to avoid certain medications such as antihistamine/decongestant drops, cold medications and to read labels on over the counter and prescription medications in which it may cause an acute angle-closure glaucoma attack until procedures are done in both eyes. no back pain/no chest pain/no chills/no congestion/no diaphoresis/no dizziness/no fever/no nausea/no shortness of breath/no syncope/no vomiting

## 2022-10-21 ENCOUNTER — RESULT REVIEW (OUTPATIENT)
Age: 77
End: 2022-10-21

## 2022-10-21 ENCOUNTER — APPOINTMENT (OUTPATIENT)
Dept: ULTRASOUND IMAGING | Facility: CLINIC | Age: 77
End: 2022-10-21

## 2022-10-21 ENCOUNTER — APPOINTMENT (OUTPATIENT)
Dept: MAMMOGRAPHY | Facility: CLINIC | Age: 77
End: 2022-10-21

## 2022-10-21 PROCEDURE — 77067 SCR MAMMO BI INCL CAD: CPT

## 2022-10-21 PROCEDURE — 77063 BREAST TOMOSYNTHESIS BI: CPT

## 2022-10-21 PROCEDURE — 76641 ULTRASOUND BREAST COMPLETE: CPT | Mod: 50

## 2023-07-06 ENCOUNTER — APPOINTMENT (OUTPATIENT)
Dept: OTOLARYNGOLOGY | Facility: CLINIC | Age: 78
End: 2023-07-06
Payer: MEDICARE

## 2023-07-06 VITALS
DIASTOLIC BLOOD PRESSURE: 65 MMHG | WEIGHT: 94 LBS | BODY MASS INDEX: 16.66 KG/M2 | TEMPERATURE: 98 F | HEIGHT: 63 IN | SYSTOLIC BLOOD PRESSURE: 101 MMHG | OXYGEN SATURATION: 98 % | HEART RATE: 96 BPM

## 2023-07-06 DIAGNOSIS — C13.9 MALIGNANT NEOPLASM OF HYPOPHARYNX, UNSPECIFIED: ICD-10-CM

## 2023-07-06 DIAGNOSIS — C15.9 MALIGNANT NEOPLASM OF ESOPHAGUS, UNSPECIFIED: ICD-10-CM

## 2023-07-06 DIAGNOSIS — R63.4 ABNORMAL WEIGHT LOSS: ICD-10-CM

## 2023-07-06 DIAGNOSIS — K21.9 GASTRO-ESOPHAGEAL REFLUX DISEASE W/OUT ESOPHAGITIS: ICD-10-CM

## 2023-07-06 DIAGNOSIS — D49.0 NEOPLASM OF UNSPECIFIED BEHAVIOR OF DIGESTIVE SYSTEM: ICD-10-CM

## 2023-07-06 DIAGNOSIS — C32.1 MALIGNANT NEOPLASM OF SUPRAGLOTTIS: ICD-10-CM

## 2023-07-06 DIAGNOSIS — J37.0 CHRONIC LARYNGITIS: ICD-10-CM

## 2023-07-06 DIAGNOSIS — C10.9 MALIGNANT NEOPLASM OF OROPHARYNX, UNSPECIFIED: ICD-10-CM

## 2023-07-06 DIAGNOSIS — F17.200 NICOTINE DEPENDENCE, UNSPECIFIED, UNCOMPLICATED: ICD-10-CM

## 2023-07-06 PROCEDURE — 31575 DIAGNOSTIC LARYNGOSCOPY: CPT

## 2023-07-06 PROCEDURE — 99215 OFFICE O/P EST HI 40 MIN: CPT | Mod: 25

## 2023-07-06 NOTE — HISTORY OF PRESENT ILLNESS
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  She has been evaluated for IBS. 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 She was noted to have a depressed lesion in the right lateral posterior tongue noted by her gastroenterologist, Dr. De Los Santos.  She reports pain when chewing after an upper right molar tooth this past summer with continued irritation. She has no bleeding or dysphagia/dysarthria. She has chronic hoarseness and not able to quit smoking. She may have bitten down on the tongue after the extraction but she is not certain. She has non-suspicious pulmonary nodules and a PET/CT done on 9/17/2020 did not reveal any hypermetabolic activity in the neck. She also has moderate sleep apnea and uses a CPAP machine intermittently.  \par  [FreeTextEntry1] : Noemy returns for a follow up visit after WLE right tongue scca (re-excision after punch biopsy) on 12/2/2020. Surgical pathology was negative for residual invasive carcinoma or high grade dysplasia.  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 This mutation may explain her numerous malignancies including tongue squamous cell cancer, intraductal breast cancer, carcinoma in situ of the esophagus, a stable lung nodule.  Her tongue has healed well and she denies ulcerations, bleeding or new mass. She had FDG-PET/CT at [facility] on 05/06/2021.  She reports that it was negative for malignancy.  She had a follow-up esophagoscopy which was JULIAN.  However, she has been intolerant with the Hydrea treatment because of diarrhea and now needs to see another hematologist at [facility] for alternate treatment.  Since her last visit she has been back to her dentist and the sharp tooth that was rubbing against her tongue has since been polished down and smooth.  The tongue biopsy site had healed completely. However, she developed a new slightly raised  ~1 cm patch of slightly raised, friable mucosa with an exudate in the mid posterior pharyngeal wall at the level of the tongue base that was proven as a new invasive squamous cell carcinoma.  She was presented at our H & N tumor board and consensus was to proceed with EBRT.  She completed radiation in January 2022 at [facility].  The April 2022 neck CT (+C)  showed no evidence for persistent or recurrent tumor.  A pulmonary nodule was ultimately adenocarcinoma and treated with EBRT in December 2022. A follow-up CT chest/abd/pelvis C(+) at [facility] was done in June 2023 only demonstrating increased density of a right lower lobe nodule with increased adjacent ground-glass opacities, probably representing evolving post radiation changes of a biopsy proven adenocarcinoma. She had anal carcinoma treated with chemo/RT that finished 4/16/23.  She had lost ~ 10 pounds and is taking Ensure.    She denies fever, body aches, cough, cyanosis, chest burning, anosmia or recent known COVID exposures.  All family members at home are well. She has been vaccinated (Pfizer) and boosted. She is still smoking despite all admonishing. She is under a great deal of stress as her  was diagnosed with Lewey body dementia. She is managing now with minimal home help.

## 2023-07-06 NOTE — REASON FOR VISIT
[FreeTextEntry2] : a followup visit after WLE of right tongue scca in December 2020 and EBRT at [facility] for posterior pharyngeal wall squamous cell carcinoma.  [FreeTextEntry1] : Kira Stanley MD PCP and referrer, New Heme/Onc is Maru Thornton MD [phone]

## 2023-07-06 NOTE — PROCEDURE
[Image(s) Captured] : image(s) captured and filed [Unable to Cooperate with Mirror] : patient unable to cooperate with mirror [Gag Reflex] : gag reflex preventing mirror examination [Hoarseness] : hoarseness not clearly evaluated by indirect laryngoscopy [Complicated Symptoms] : complicated symptoms requiring more thorough examination than provided by mirror [Topical Lidocaine] : topical lidocaine [Oxymetazoline HCl] : oxymetazoline HCl [Flexible Endoscope] : examined with the flexible endoscope [Serial Number: ___] : Serial Number: [unfilled] [de-identified] : The nasal septum is minimally deviated to the right anteriorly. There are no masses or polyps and the nasal mucosa and secretions are normal. The choanae and posterior nasopharynx are normal without masses or drainage. The Eustachian tube orifices appear patent. There is no longer an erythematous patch of  mucosa in the posterior pharyngeal wall, the vallecula and base of tongue are normal without ulcerations, lesions or masses. The hypopharynx including the pyriform sinuses open well without pooling of secretions, mucosal lesions or masses. The supraglottic larynx including the epiglottis, petiole, arytenoids, glossoepiglottic, aryepiglottic and pharyngoepiglottic folds are normal without mucosal lesions, ulcerations or masses. There is diffuse post radiation changes with erythema. The glottis reveals normal false vocal folds. The true vocal folds are thickened and with bilateral edema. There are stable floppy, sessile edematous polyps now with new patchy leukoplakia on the right media true vocal fold concerning for early sq cell ca/dysplasia.  There is no erythroplasia. The posterior cricoid area has healthy pink mucosa in the interarytenoid area and esophageal inlet. There is moderate thickening/edema of the interarytenoid mucosa suggestive of posterior laryngitis from laryngopharyngeal acid reflux disease. The trachea is clear without narrowing in the immediate subglottic region, without deviation or lesions. \par  [de-identified] : tongue cancer, esophageal cancer, pharyngeal wall sq cell ca.

## 2023-07-06 NOTE — CONSULT LETTER
[Dear  ___] : Dear  [unfilled], [Consult Letter:] : I had the pleasure of evaluating your patient, [unfilled]. [Please see my note below.] : Please see my note below. [Consult Closing:] : Thank you very much for allowing me to participate in the care of this patient.  If you have any questions, please do not hesitate to contact me. [Sincerely,] : Sincerely, [DrRosalie  ___] : Dr. TILLMAN [DrRosalie ___] : Dr. TILLMAN [FreeTextEntry3] : \par Rahul Del Rosario M.D., FACS, ECNU\par Director Center for Thyroid & Parathyroid Surgery\par [facility]\par Certified in Thyroid/Parathyroid/Neck Ultrasound, ECNU/ AIUM\par \par , Department of Otolaryngology\par [employer] [employer] at [facility]\par
